# Patient Record
Sex: FEMALE | Race: BLACK OR AFRICAN AMERICAN | NOT HISPANIC OR LATINO | URBAN - METROPOLITAN AREA
[De-identification: names, ages, dates, MRNs, and addresses within clinical notes are randomized per-mention and may not be internally consistent; named-entity substitution may affect disease eponyms.]

---

## 2019-03-17 PROCEDURE — 87491 CHLMYD TRACH DNA AMP PROBE: CPT | Performed by: NURSE PRACTITIONER

## 2019-03-17 PROCEDURE — 87186 SC STD MICRODIL/AGAR DIL: CPT | Performed by: NURSE PRACTITIONER

## 2019-03-17 PROCEDURE — 87077 CULTURE AEROBIC IDENTIFY: CPT | Performed by: NURSE PRACTITIONER

## 2019-03-17 PROCEDURE — 87661 TRICHOMONAS VAGINALIS AMPLIF: CPT | Performed by: NURSE PRACTITIONER

## 2019-03-17 PROCEDURE — 87086 URINE CULTURE/COLONY COUNT: CPT | Performed by: NURSE PRACTITIONER

## 2019-03-17 PROCEDURE — 87591 N.GONORRHOEAE DNA AMP PROB: CPT | Performed by: NURSE PRACTITIONER

## 2019-03-19 ENCOUNTER — TELEPHONE (OUTPATIENT)
Dept: URGENT CARE | Facility: CLINIC | Age: 34
End: 2019-03-19

## 2019-03-19 NOTE — TELEPHONE ENCOUNTER
Notified Ms. Tolliver of urine culture results, states symptoms are improving. Advised to continue Macrobid, will call with STD results when available.

## 2019-03-20 ENCOUNTER — TELEPHONE (OUTPATIENT)
Dept: URGENT CARE | Facility: CLINIC | Age: 34
End: 2019-03-20

## 2020-01-28 PROCEDURE — 87591 N.GONORRHOEAE DNA AMP PROB: CPT | Performed by: EMERGENCY MEDICINE

## 2020-01-28 PROCEDURE — 87491 CHLMYD TRACH DNA AMP PROBE: CPT | Performed by: EMERGENCY MEDICINE

## 2020-01-28 PROCEDURE — 87661 TRICHOMONAS VAGINALIS AMPLIF: CPT | Performed by: EMERGENCY MEDICINE

## 2020-02-03 ENCOUNTER — TELEPHONE (OUTPATIENT)
Dept: URGENT CARE | Facility: CLINIC | Age: 35
End: 2020-02-03

## 2020-02-04 NOTE — TELEPHONE ENCOUNTER
Notified Ms. Tolliver of urine STD results. Advised she did test positive for trichomonas, she denies discharge has a foul odor. RX for Flagyl 2000mg once sent to [address]. No unprotected sex x 1 week after both partners are treated. Notify all partners of positive results. If symptoms persist follow up with PCP. Voiced understanding.

## 2020-06-10 PROCEDURE — 87591 N.GONORRHOEAE DNA AMP PROB: CPT | Performed by: NURSE PRACTITIONER

## 2020-06-10 PROCEDURE — 87661 TRICHOMONAS VAGINALIS AMPLIF: CPT | Performed by: NURSE PRACTITIONER

## 2020-06-10 PROCEDURE — 87186 SC STD MICRODIL/AGAR DIL: CPT | Performed by: NURSE PRACTITIONER

## 2020-06-10 PROCEDURE — 87801 DETECT AGNT MULT DNA AMPLI: CPT | Performed by: NURSE PRACTITIONER

## 2020-06-10 PROCEDURE — 87088 URINE BACTERIA CULTURE: CPT | Performed by: NURSE PRACTITIONER

## 2020-06-10 PROCEDURE — 87491 CHLMYD TRACH DNA AMP PROBE: CPT | Performed by: NURSE PRACTITIONER

## 2020-06-10 PROCEDURE — 87798 DETECT AGENT NOS DNA AMP: CPT | Performed by: NURSE PRACTITIONER

## 2020-06-10 PROCEDURE — 87529 HSV DNA AMP PROBE: CPT | Performed by: NURSE PRACTITIONER

## 2020-06-10 PROCEDURE — 87086 URINE CULTURE/COLONY COUNT: CPT | Performed by: NURSE PRACTITIONER

## 2020-06-16 ENCOUNTER — TELEPHONE (OUTPATIENT)
Dept: URGENT CARE | Facility: CLINIC | Age: 35
End: 2020-06-16

## 2020-06-18 ENCOUNTER — TELEPHONE (OUTPATIENT)
Dept: URGENT CARE | Facility: CLINIC | Age: 35
End: 2020-06-18

## 2020-06-18 NOTE — TELEPHONE ENCOUNTER
Pt called and given negative NuSwab results as well as urine cx results. Pt states she is feeling better. No questions at this time.

## 2020-06-26 ENCOUNTER — OFFICE VISIT (OUTPATIENT)
Dept: INTERNAL MEDICINE | Facility: CLINIC | Age: 35
End: 2020-06-26

## 2020-06-26 VITALS
RESPIRATION RATE: 16 BRPM | SYSTOLIC BLOOD PRESSURE: 112 MMHG | WEIGHT: 160 LBS | HEART RATE: 58 BPM | BODY MASS INDEX: 26.66 KG/M2 | HEIGHT: 65 IN | OXYGEN SATURATION: 97 % | TEMPERATURE: 98.2 F | DIASTOLIC BLOOD PRESSURE: 68 MMHG

## 2020-06-26 DIAGNOSIS — H61.21 IMPACTED CERUMEN OF RIGHT EAR: ICD-10-CM

## 2020-06-26 DIAGNOSIS — N39.0 E-COLI UTI: Primary | ICD-10-CM

## 2020-06-26 DIAGNOSIS — B96.20 E-COLI UTI: Primary | ICD-10-CM

## 2020-06-26 LAB
BILIRUB BLD-MCNC: NEGATIVE MG/DL
CLARITY, POC: CLEAR
COLOR UR: YELLOW
GLUCOSE UR STRIP-MCNC: NEGATIVE MG/DL
KETONES UR QL: NEGATIVE
LEUKOCYTE EST, POC: NEGATIVE
NITRITE UR-MCNC: NEGATIVE MG/ML
PH UR: 8 [PH] (ref 5–8)
PROT UR STRIP-MCNC: NEGATIVE MG/DL
RBC # UR STRIP: NEGATIVE /UL
SP GR UR: 1.01 (ref 1–1.03)
UROBILINOGEN UR QL: NORMAL

## 2020-06-26 PROCEDURE — 69209 REMOVE IMPACTED EAR WAX UNI: CPT | Performed by: NURSE PRACTITIONER

## 2020-06-26 PROCEDURE — 99214 OFFICE O/P EST MOD 30 MIN: CPT | Performed by: NURSE PRACTITIONER

## 2020-06-26 RX ORDER — MULTIVIT WITH MINERALS/LUTEIN
1000 TABLET ORAL DAILY
COMMUNITY

## 2020-06-26 NOTE — PROGRESS NOTES
Subjective   Alesha Tolliver is a 34 y.o. female here to establish care.  Chief Complaint   Patient presents with   • Establish Care   • Urinary Tract Infection     follow up from  for UTI-much better        Urinary Tract Infection    This is a new problem. The current episode started 1 to 4 weeks ago. The problem has been resolved. The pain is at a severity of 0/10. There has been no fever. She is sexually active. There is no history of pyelonephritis. Pertinent negatives include no chills, discharge, flank pain, frequency, hematuria, hesitancy, nausea, possible pregnancy, sweats, urgency or vomiting. She has tried antibiotics for the symptoms. The treatment provided significant relief. There is no history of catheterization, kidney stones, recurrent UTIs, a single kidney, urinary stasis or a urological procedure.      Patient's last menstrual period was 06/15/2020 (exact date).    She had an e-coli UTI that was treated with Macrobid  Sexually active with multiple partners. Uses condoms with one partner. Had negative nuswab+ and negative urine pregnancy  6/10/2020  She was advised to have the UTI rechecked.   She has also had her ears irrigated 6/10/2020. Feels better, has no hearing loss or ear pain .       Recent Results (from the past 672 hour(s))   POC Urinalysis Dipstick, Multipro (Automated dipstick)    Collection Time: 06/10/20  9:46 AM   Result Value Ref Range    Color Yellow Yellow, Straw, Dark Yellow, Celi    Clarity, UA Clear Clear    Glucose, UA Negative Negative, 1000 mg/dL (3+) mg/dL    Bilirubin Negative Negative    Ketones, UA Negative Negative    Specific Gravity  1.015 1.005 - 1.030    Blood, UA Negative Negative    pH, Urine 8.0 5.0 - 8.0    Protein, POC Negative Negative mg/dL    Urobilinogen, UA Normal Normal    Nitrite, UA Positive (A) Negative    Leukocytes Small (1+) (A) Negative   Urine Culture - Urine, Urine, Clean Catch    Collection Time: 06/10/20  9:47 AM   Result Value Ref Range     Urine Culture >100,000 CFU/mL Escherichia coli (A)        Susceptibility    Escherichia coli - LEBRON     Ampicillin 4 Susceptible ug/ml     Ampicillin + Sulbactam <=2 Susceptible ug/ml     Cefazolin <=4 Susceptible ug/ml     Cefepime <=1 Susceptible ug/ml     Ceftazidime <=1 Susceptible ug/ml     Ceftriaxone <=1 Susceptible ug/ml     Gentamicin <=1 Susceptible ug/ml     Levofloxacin <=0.12 Susceptible ug/ml     Nitrofurantoin <=16 Susceptible ug/ml     Piperacillin + Tazobactam <=4 Susceptible ug/ml     Tetracycline <=1 Susceptible ug/ml     Trimethoprim + Sulfamethoxazole <=20 Susceptible ug/ml   POCT Peform Urine Pregnancy    Collection Time: 06/10/20  9:47 AM   Result Value Ref Range    HCG, Urine, QL Negative Negative    Lot Number [id number]     Internal Positive Control Positive     Internal Negative Control Negative    NuSwab VG+ & HSV    Collection Time: 06/10/20  9:47 AM   Result Value Ref Range    Atopobium Vaginae Low - 0 Score    BVAB 2 Low - 0 Score    Megasphaera 1 Low - 0 Score    Candida Albicans, ML Negative Negative    Candida Glabrata, ML Negative Negative    Trichomonas vaginosis Negative Negative    Chlamydia trachomatis, ML Negative Negative    Neisseria gonorrhoeae, ML Negative Negative    HSV 1 ML Negative Negative    HSV 2 ML Negative Negative         The following portions of the patient's history were reviewed and updated as appropriate: allergies, current medications, past family history, past medical history, past social history, past surgical history and problem list.    Review of Systems   Constitutional: Negative for chills, fatigue, fever and unexpected weight loss.   HENT: Negative.  Negative for ear discharge and ear pain.    Eyes: Negative for pain and visual disturbance.   Respiratory: Negative for cough, chest tightness and shortness of breath.    Cardiovascular: Negative for chest pain, palpitations and leg swelling.   Gastrointestinal: Negative for abdominal pain,  constipation, diarrhea, nausea and vomiting.   Genitourinary: Negative.  Negative for decreased libido, difficulty urinating, dysuria, flank pain, frequency, hematuria, hesitancy, pelvic pressure, urgency, urinary incontinence, vaginal discharge and vaginal pain.   Musculoskeletal: Negative.  Negative for arthralgias and myalgias.   Skin: Negative for color change and rash.   Neurological: Negative for dizziness, weakness, light-headedness, headache and confusion.   Hematological: Does not bruise/bleed easily.           No Known Allergies  Past Medical History:   Diagnosis Date   • UTI (urinary tract infection) 2020     Past Surgical History:   Procedure Laterality Date   •  SECTION      x3   • TUBAL ABDOMINAL LIGATION     • WISDOM TOOTH EXTRACTION       Family History   Problem Relation Age of Onset   • Rashes / Skin problems Daughter         eczema   • Hypertension Mother    • No Known Problems Father      Social History     Socioeconomic History   • Marital status: Single     Spouse name: Not on file   • Number of children: 3   • Years of education: Not on file   • Highest education level: Not on file   Occupational History   • Occupation: housekeeping at [employer]   Social Needs   • Financial resource strain: Not hard at all   • Food insecurity:     Worry: Never true     Inability: Never true   • Transportation needs:     Medical: No     Non-medical: No   Tobacco Use   • Smoking status: Never Smoker   • Smokeless tobacco: Never Used   Substance and Sexual Activity   • Alcohol use: No   • Drug use: No   • Sexual activity: Yes     Partners: Male     Birth control/protection: Tubal ligation   Lifestyle   • Physical activity:     Days per week: 6 days     Minutes per session: 120 min   • Stress: Only a little   Social History Narrative    Lives with 3 kids and her 2 dogs.        There is no immunization history on file for this patient.    Current Outpatient Medications:   •  Multiple Vitamins-Minerals  "(MULTIVITAMIN ADULT PO), Take 1 tablet by mouth Daily., Disp: , Rfl:   •  Probiotic Product (DIGESTIVE ADVANTAGE PO), Take  by mouth., Disp: , Rfl:   •  vitamin E 1000 UNIT capsule, Take 1,000 Units by mouth Daily., Disp: , Rfl:     Objective   Blood pressure 112/68, pulse 58, temperature 98.2 °F (36.8 °C), resp. rate 16, height 163.8 cm (64.5\"), weight 72.6 kg (160 lb), last menstrual period 06/15/2020, SpO2 97 %, not currently breastfeeding.  Physical Exam   Constitutional: She is oriented to person, place, and time. She appears well-developed and well-nourished. No distress.   HENT:   Head: Normocephalic and atraumatic.   Right Ear: Hearing and external ear normal.   Left Ear: Hearing, tympanic membrane, external ear and ear canal normal.   right cerumen impaction noted.      Eyes: Pupils are equal, round, and reactive to light. Conjunctivae are normal.   Neck: Normal range of motion. No JVD present.   Cardiovascular: Normal rate, regular rhythm, normal heart sounds and intact distal pulses.   No murmur heard.  Pulmonary/Chest: Effort normal and breath sounds normal. No respiratory distress. She exhibits no tenderness.   Abdominal: Soft. Normal appearance and bowel sounds are normal. She exhibits no distension. There is no tenderness.   Musculoskeletal: Normal range of motion. She exhibits no edema.   Neurological: She is alert and oriented to person, place, and time.   Skin: Skin is warm and dry. She is not diaphoretic. No erythema. No pallor.   Psychiatric: She has a normal mood and affect. Her behavior is normal. Judgment and thought content normal.   Nursing note and vitals reviewed.  Ear Cerumen Removal  Date/Time: 6/26/2020 1:48 PM  Performed by: Gabbie Lezama  Authorized by: Rosalia Armenta APRN     Anesthesia:  Local Anesthetic: none  Location details: right ear  Patient tolerance: Patient tolerated the procedure well with no immediate complications  Procedure type: irrigation   Sedation:  Patient " sedated: no              Assessment/Plan   Diagnoses and all orders for this visit:    1. E-coli UTI (Primary)  -     POC Urinalysis Dipstick, Automated  Repeat urine dip in office looks good    2. Impacted cerumen of right ear  -     Ear Cerumen Removal      Brief Urine Lab Results  (Last result in the past 365 days)      Color   Clarity   Blood   Leuk Est   Nitrite   Protein   CREAT   Urine HCG        06/10/20 0947               Negative              Return in about 6 weeks (around 8/7/2020) for Annual, with pap, with fasting labs.  Plan of care discussed with pt. They verbalized understanding and agreement.       * Please note that portions of this note were completed with a voice recognition program. Efforts were made to edit the dictation but occasionally words are erroneously transcribed.

## 2020-09-11 ENCOUNTER — LAB (OUTPATIENT)
Dept: LAB | Facility: HOSPITAL | Age: 35
End: 2020-09-11

## 2020-09-11 ENCOUNTER — OFFICE VISIT (OUTPATIENT)
Dept: INTERNAL MEDICINE | Facility: CLINIC | Age: 35
End: 2020-09-11

## 2020-09-11 VITALS
OXYGEN SATURATION: 98 % | WEIGHT: 158 LBS | HEART RATE: 55 BPM | HEIGHT: 65 IN | BODY MASS INDEX: 26.33 KG/M2 | DIASTOLIC BLOOD PRESSURE: 64 MMHG | SYSTOLIC BLOOD PRESSURE: 110 MMHG | RESPIRATION RATE: 16 BRPM | TEMPERATURE: 97.7 F

## 2020-09-11 DIAGNOSIS — Z23 NEED FOR DIPHTHERIA-TETANUS-PERTUSSIS (TDAP) VACCINE: ICD-10-CM

## 2020-09-11 DIAGNOSIS — Z11.3 SCREEN FOR STD (SEXUALLY TRANSMITTED DISEASE): ICD-10-CM

## 2020-09-11 DIAGNOSIS — F32.A MILD DEPRESSION: ICD-10-CM

## 2020-09-11 DIAGNOSIS — Z00.00 ANNUAL PHYSICAL EXAM: Primary | ICD-10-CM

## 2020-09-11 DIAGNOSIS — Z00.00 ANNUAL PHYSICAL EXAM: ICD-10-CM

## 2020-09-11 LAB — HOLD SPECIMEN: NORMAL

## 2020-09-11 PROCEDURE — 99395 PREV VISIT EST AGE 18-39: CPT | Performed by: NURSE PRACTITIONER

## 2020-09-11 PROCEDURE — 86592 SYPHILIS TEST NON-TREP QUAL: CPT

## 2020-09-11 PROCEDURE — 80061 LIPID PANEL: CPT

## 2020-09-11 PROCEDURE — 90715 TDAP VACCINE 7 YRS/> IM: CPT | Performed by: NURSE PRACTITIONER

## 2020-09-11 PROCEDURE — 80074 ACUTE HEPATITIS PANEL: CPT

## 2020-09-11 PROCEDURE — 80053 COMPREHEN METABOLIC PANEL: CPT

## 2020-09-11 PROCEDURE — 86696 HERPES SIMPLEX TYPE 2 TEST: CPT

## 2020-09-11 PROCEDURE — 86695 HERPES SIMPLEX TYPE 1 TEST: CPT

## 2020-09-11 PROCEDURE — G0432 EIA HIV-1/HIV-2 SCREEN: HCPCS

## 2020-09-11 PROCEDURE — 90471 IMMUNIZATION ADMIN: CPT | Performed by: NURSE PRACTITIONER

## 2020-09-11 PROCEDURE — 85027 COMPLETE CBC AUTOMATED: CPT

## 2020-09-11 NOTE — PROGRESS NOTES
Patient Care Team:  Rosalia Armenta APRN as PCP - General (Nurse Practitioner)  Madison Nayak     Chief complaint: Patient is in today for a physical          Patient is a 35 y.o. female who presents for her yearly physical exam.     HPI    No acute complaints    Health maintenance/lifestyle:  Immunization History   Administered Date(s) Administered   • Hep B, Adolescent or Pediatric 04/08/1998   • Hepatitis B 05/08/1998, 10/08/1998   • MMR 04/08/1998   • Td 04/08/1998   • Tdap 09/11/2020       Influenza: will get with work   Tdap: unknown   Hep C screening: never     STI concerns: Would like a routine screening.  No known exposures or partners.  She is asymptomatic.    Pap: DUE   Self breast exam: does, no concerning areas.   Menses: regualr  Patient's last menstrual period was 09/02/2020 (exact date).    Eye exam: UTD     Sunscreen use: wears  Seatbelt use: wears    Diet: eating healthy during the week, less so on weekends  Caffeine: 2 C coffee per day  Multivitamin: takes  Exercise: weights    Social History     Tobacco Use   Smoking Status Never Smoker   Smokeless Tobacco Never Used     Social History     Substance and Sexual Activity   Alcohol Use No       PHQ-9 Depression Screening  Little interest or pleasure in doing things? 2   Feeling down, depressed, or hopeless? 1(feeling down)   Trouble falling or staying asleep, or sleeping too much? 0   Feeling tired or having little energy? 0   Poor appetite or overeating? 0   Feeling bad about yourself - or that you are a failure or have let yourself or your family down? 0   Trouble concentrating on things, such as reading the newspaper or watching television? 0   Moving or speaking so slowly that other people could have noticed? Or the opposite - being so fidgety or restless that you have been moving around a lot more than usual? 0   Thoughts that you would be better off dead, or of hurting yourself in some way? 0   PHQ-9 Total Score 3   If you checked off any  problems, how difficult have these problems made it for you to do your work, take care of things at home, or get along with other people? Not difficult at all     Seeing a counselor once a week through telehealth.   Does not want to use meds.       Review of Systems   Constitutional: Negative for fatigue, fever and unexpected weight loss.   HENT: Negative.    Eyes: Negative for pain and visual disturbance.   Respiratory: Negative for cough and shortness of breath.    Cardiovascular: Negative for chest pain, palpitations and leg swelling.   Gastrointestinal: Negative for abdominal pain, constipation and diarrhea.   Genitourinary: Negative.  Negative for difficulty urinating.   Musculoskeletal: Negative.  Negative for arthralgias and myalgias.   Skin: Negative for color change and rash.   Neurological: Negative for dizziness, weakness and headache.   Hematological: Does not bruise/bleed easily.   Psychiatric/Behavioral: Positive for depressed mood. Negative for decreased concentration, self-injury, sleep disturbance, suicidal ideas and stress. The patient is not nervous/anxious.          History  Past Medical History:   Diagnosis Date   • UTI (urinary tract infection) 2020      Past Surgical History:   Procedure Laterality Date   •  SECTION      x3   • TUBAL ABDOMINAL LIGATION     • WISDOM TOOTH EXTRACTION        No Known Allergies   Family History   Problem Relation Age of Onset   • Rashes / Skin problems Daughter         eczema   • Hypertension Mother    • No Known Problems Father      Social History     Socioeconomic History   • Marital status: Single     Spouse name: Not on file   • Number of children: 3   • Years of education: Not on file   • Highest education level: Not on file   Occupational History   • Occupation: housekeeping at [employer]   Social Needs   • Financial resource strain: Not hard at all   • Food insecurity     Worry: Never true     Inability: Never true   • Transportation needs     Medical:  "No     Non-medical: No   Tobacco Use   • Smoking status: Never Smoker   • Smokeless tobacco: Never Used   Substance and Sexual Activity   • Alcohol use: No   • Drug use: No   • Sexual activity: Yes     Partners: Male     Birth control/protection: Tubal ligation   Lifestyle   • Physical activity     Days per week: 6 days     Minutes per session: 120 min   • Stress: Only a little   Social History Narrative    Lives with 3 kids and her 2 dogs.         Current Outpatient Medications:   •  Loratadine-Pseudoephedrine (CLARITIN-D 24 HOUR PO), Take 1 tablet by mouth Daily., Disp: , Rfl:   •  Multiple Vitamins-Minerals (MULTIVITAMIN ADULT PO), Take 1 tablet by mouth Daily., Disp: , Rfl:   •  Probiotic Product (DIGESTIVE ADVANTAGE PO), Take  by mouth., Disp: , Rfl:   •  vitamin E 1000 UNIT capsule, Take 1,000 Units by mouth Daily., Disp: , Rfl:                   /64   Pulse 55   Temp 97.7 °F (36.5 °C)   Resp 16   Ht 163.8 cm (64.5\")   Wt 71.7 kg (158 lb)   LMP 09/02/2020 (Exact Date)   SpO2 98%   Breastfeeding No   BMI 26.70 kg/m²       Physical Exam   Constitutional: She is oriented to person, place, and time. She appears well-developed and well-nourished. No distress.   HENT:   Head: Normocephalic and atraumatic.   Right Ear: External ear normal.   Left Ear: External ear normal.   Mouth/Throat: Oropharynx is clear and moist. No oropharyngeal exudate.   Eyes: Pupils are equal, round, and reactive to light. Conjunctivae and EOM are normal. Right eye exhibits no discharge. Left eye exhibits no discharge. No scleral icterus.   Neck: Normal range of motion. Neck supple. No JVD present. Carotid bruit is not present. No tracheal deviation present. No thyromegaly present.   Cardiovascular: Normal rate, regular rhythm, normal heart sounds and intact distal pulses. Exam reveals no friction rub.   No murmur heard.  Pulmonary/Chest: Effort normal and breath sounds normal. No respiratory distress. She has no wheezes. She " has no rales. She exhibits no tenderness. Right breast exhibits no inverted nipple, no mass, no nipple discharge, no skin change and no tenderness. Left breast exhibits no inverted nipple, no mass, no nipple discharge, no skin change and no tenderness. Breasts are symmetrical.   Abdominal: Soft. Normal appearance and bowel sounds are normal. She exhibits no distension and no mass. There is no abdominal tenderness. No hernia. Hernia confirmed negative in the left inguinal area and confirmed negative in the right inguinal area.   Genitourinary:    Vulva, vagina, cervix, right adnexa and left adnexa normal.      Pelvic exam was performed with patient supine.   There is no rash, tenderness, lesion or injury on the right labia. There is no rash, tenderness, lesion or injury on the left labia.   Musculoskeletal: Normal range of motion. No tenderness, deformity or edema.   Lymphadenopathy:     She has no cervical adenopathy.   Neurological: She is alert and oriented to person, place, and time. She has normal reflexes. She displays normal reflexes.   Skin: Skin is warm and dry. No rash noted. She is not diaphoretic. No erythema. No pallor.   Psychiatric: She has a normal mood and affect. Her behavior is normal. Judgment and thought content normal.   Nursing note and vitals reviewed.                Diagnoses and all orders for this visit:    1. Annual physical exam (Primary)  -     CBC (No Diff); Future  -     Comprehensive Metabolic Panel; Future  -     Lipid Panel; Future  -     Liquid-based Pap Smear, Screening; Future  -     Tdap Vaccine Greater Than or Equal To 6yo IM  -     Hepatitis Panel, Acute; Future  -     HIV-1 / O / 2 Ag / Antibody 4th Generation; Future  -     HSV 1 & 2 IgM, Antibodies, Indirect; Future  -     HSV 1 & 2 - Specific Antibody, IgG; Future  -     RPR; Future    2. Screen for STD (sexually transmitted disease)  -     Liquid-based Pap Smear, Screening; Future  -     HSV 1 & 2 IgM, Antibodies,  Indirect; Future  -     HSV 1 & 2 - Specific Antibody, IgG; Future  -     RPR; Future    3. Need for diphtheria-tetanus-pertussis (Tdap) vaccine  -     Tdap Vaccine Greater Than or Equal To 8yo IM    4. Mild depression (CMS/HCC)  Follows with psychology for routine counseling.  She declines any medications at this time.  Denies any suicidal homicidal thoughts.  Continue to follow with psychology.  Follow-up with me for this PRN if symptoms worsen or she reconsiders the use of medications.      Labs sent as above- will notify of results and treat accordingly. If patient has not received results within one week, they will notify my office    Immunizations and screenings hepatitis and STD screening sent, Pap and breast exam completed, Tdap updated, she will get her flu vaccination through work.  Counseling:  The patient is advised to continue current healthy lifestyle patterns and return for routine annual checkups.  Follow up: Return in about 1 year (around 9/11/2021) for Annual, Labs today.  Plan of care discussed with pt. They verbalized understanding and agreement.     Rosalia Armenta, APRN                * Please note that portions of this note were completed with a voice recognition program. Efforts were made to edit the dictation but occasionally words are erroneously transcribed.

## 2020-09-12 LAB
ALBUMIN SERPL-MCNC: 4.5 G/DL (ref 3.5–5.2)
ALBUMIN/GLOB SERPL: 1.6 G/DL
ALP SERPL-CCNC: 46 U/L (ref 39–117)
ALT SERPL W P-5'-P-CCNC: 17 U/L (ref 1–33)
ANION GAP SERPL CALCULATED.3IONS-SCNC: 6.5 MMOL/L (ref 5–15)
AST SERPL-CCNC: 22 U/L (ref 1–32)
BILIRUB SERPL-MCNC: 0.4 MG/DL (ref 0–1.2)
BUN SERPL-MCNC: 15 MG/DL (ref 6–20)
BUN/CREAT SERPL: 14.2 (ref 7–25)
CALCIUM SPEC-SCNC: 9.2 MG/DL (ref 8.6–10.5)
CHLORIDE SERPL-SCNC: 105 MMOL/L (ref 98–107)
CHOLEST SERPL-MCNC: 162 MG/DL (ref 0–200)
CO2 SERPL-SCNC: 25.5 MMOL/L (ref 22–29)
CREAT SERPL-MCNC: 1.06 MG/DL (ref 0.57–1)
DEPRECATED RDW RBC AUTO: 40.7 FL (ref 37–54)
ERYTHROCYTE [DISTWIDTH] IN BLOOD BY AUTOMATED COUNT: 11.9 % (ref 12.3–15.4)
GFR SERPL CREATININE-BSD FRML MDRD: 71 ML/MIN/1.73
GLOBULIN UR ELPH-MCNC: 2.9 GM/DL
GLUCOSE SERPL-MCNC: 75 MG/DL (ref 65–99)
HAV IGM SERPL QL IA: NORMAL
HBV CORE IGM SERPL QL IA: NORMAL
HBV SURFACE AG SERPL QL IA: NORMAL
HCT VFR BLD AUTO: 36.7 % (ref 34–46.6)
HCV AB SER DONR QL: NORMAL
HDLC SERPL-MCNC: 54 MG/DL (ref 40–60)
HGB BLD-MCNC: 12.7 G/DL (ref 12–15.9)
HIV1+2 AB SER QL: NORMAL
LDLC SERPL CALC-MCNC: 99 MG/DL (ref 0–100)
LDLC/HDLC SERPL: 1.84 {RATIO}
MCH RBC QN AUTO: 32.2 PG (ref 26.6–33)
MCHC RBC AUTO-ENTMCNC: 34.6 G/DL (ref 31.5–35.7)
MCV RBC AUTO: 92.9 FL (ref 79–97)
PLATELET # BLD AUTO: 209 10*3/MM3 (ref 140–450)
PMV BLD AUTO: 12 FL (ref 6–12)
POTASSIUM SERPL-SCNC: 4.2 MMOL/L (ref 3.5–5.2)
PROT SERPL-MCNC: 7.4 G/DL (ref 6–8.5)
RBC # BLD AUTO: 3.95 10*6/MM3 (ref 3.77–5.28)
SODIUM SERPL-SCNC: 137 MMOL/L (ref 136–145)
TRIGL SERPL-MCNC: 44 MG/DL (ref 0–150)
VLDLC SERPL-MCNC: 8.8 MG/DL (ref 5–40)
WBC # BLD AUTO: 2.78 10*3/MM3 (ref 3.4–10.8)

## 2020-09-13 PROBLEM — F32.A MILD DEPRESSION: Status: ACTIVE | Noted: 2020-09-13

## 2020-09-13 LAB — RPR SER QL: NORMAL

## 2020-09-14 LAB
HSV1 IGG SER IA-ACNC: 7.72 INDEX (ref 0–0.9)
HSV2 IGG SER IA-ACNC: 6.1 INDEX (ref 0–0.9)

## 2020-09-15 LAB
HSV1 IGM TITR SER IF: NORMAL TITER
HSV2 IGM TITR SER IF: NORMAL TITER

## 2020-09-18 DIAGNOSIS — D72.9 ABNORMAL WBC COUNT: Primary | ICD-10-CM

## 2020-09-21 ENCOUNTER — TELEPHONE (OUTPATIENT)
Dept: INTERNAL MEDICINE | Facility: CLINIC | Age: 35
End: 2020-09-21

## 2020-09-21 NOTE — TELEPHONE ENCOUNTER
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  Otherwise are negative for STIs.  There is nothing she needs to do with these at this time except for make sure that she is aware of the herpes  And it can spread to others with or without an outbreak and she should notify sexual partners.  If she were to have a herpetic outbreak (painful blistery rash/lesion) she would need to be seen and put on antiviral to make symptoms go away faster    Her white blood cell count was low.  I would like to order some additional labs to evaluate this further. Orders entered- if she can stop by within the week to get done.      Other labs in acceptable ranges   Abdomen soft, nontender, nondistended, bowel sounds present in all 4 quadrants.

## 2020-10-23 ENCOUNTER — LAB (OUTPATIENT)
Dept: LAB | Facility: HOSPITAL | Age: 35
End: 2020-10-23

## 2020-10-23 DIAGNOSIS — D72.9 ABNORMAL WBC COUNT: ICD-10-CM

## 2020-10-23 LAB
BASOPHILS # BLD MANUAL: 0.04 10*3/MM3 (ref 0–0.2)
BASOPHILS NFR BLD AUTO: 1.2 % (ref 0–1.5)
DEPRECATED RDW RBC AUTO: 39.5 FL (ref 37–54)
EOSINOPHIL # BLD MANUAL: 0.03 10*3/MM3 (ref 0–0.4)
EOSINOPHIL NFR BLD MANUAL: 0.8 % (ref 0.3–6.2)
ERYTHROCYTE [DISTWIDTH] IN BLOOD BY AUTOMATED COUNT: 12 % (ref 12.3–15.4)
HCT VFR BLD AUTO: 37.8 % (ref 34–46.6)
HGB BLD-MCNC: 12.9 G/DL (ref 12–15.9)
LYMPHOCYTES # BLD MANUAL: 2.02 10*3/MM3 (ref 0.7–3.1)
LYMPHOCYTES NFR BLD MANUAL: 5.3 % (ref 5–12)
LYMPHOCYTES NFR BLD MANUAL: 60.2 % (ref 19.6–45.3)
MCH RBC QN AUTO: 31.2 PG (ref 26.6–33)
MCHC RBC AUTO-ENTMCNC: 34.1 G/DL (ref 31.5–35.7)
MCV RBC AUTO: 91.5 FL (ref 79–97)
MONOCYTES # BLD AUTO: 0.18 10*3/MM3 (ref 0.1–0.9)
NEUTROPHILS # BLD AUTO: 1.09 10*3/MM3 (ref 1.7–7)
NEUTROPHILS NFR BLD MANUAL: 32.5 % (ref 42.7–76)
PATHOLOGY REVIEW: YES
PLAT MORPH BLD: NORMAL
PLATELET # BLD AUTO: 243 10*3/MM3 (ref 140–450)
PMV BLD AUTO: 11.9 FL (ref 6–12)
RBC # BLD AUTO: 4.13 10*6/MM3 (ref 3.77–5.28)
RBC MORPH BLD: NORMAL
WBC # BLD AUTO: 3.36 10*3/MM3 (ref 3.4–10.8)
WBC MORPH BLD: NORMAL

## 2020-10-23 PROCEDURE — 85025 COMPLETE CBC W/AUTO DIFF WBC: CPT

## 2020-10-23 PROCEDURE — 36415 COLL VENOUS BLD VENIPUNCTURE: CPT

## 2020-10-23 PROCEDURE — 85007 BL SMEAR W/DIFF WBC COUNT: CPT

## 2020-10-24 LAB
LAB AP CASE REPORT: NORMAL
PATH REPORT.FINAL DX SPEC: NORMAL

## 2020-10-25 DIAGNOSIS — D72.9 ABNORMAL WBC COUNT: Primary | ICD-10-CM

## 2020-10-27 ENCOUNTER — TELEPHONE (OUTPATIENT)
Dept: INTERNAL MEDICINE | Facility: CLINIC | Age: 35
End: 2020-10-27

## 2020-10-27 NOTE — TELEPHONE ENCOUNTER
----- Message from DEANDRE Aleman sent at 10/25/2020  9:42 AM EDT -----  Please let her know that her white blood cell count is still low but improved compared to last visit.  The further evaluation of the peripheral smear did not identify any acute hematological issues.  I would recommend that we recheck in 1 month to see if this is a persistent low white blood cell count that will need further evaluation or if this resolves and was potentially related to some mild illness or stress.  Order entered if she could just stop by the lab to have this collected at that time.

## 2022-04-28 ENCOUNTER — TELEPHONE (OUTPATIENT)
Dept: INTERNAL MEDICINE | Facility: CLINIC | Age: 37
End: 2022-04-28

## 2022-04-28 NOTE — TELEPHONE ENCOUNTER
Caller: Alesha Tolliver    Relationship to patient: Self    Best call back number:[phone]    PHYSICAL SCHEDULED FOR 05/17/22 WITH A PROVIDER THIS IS NOT PATIENT'S PCP

## 2022-10-31 ENCOUNTER — OFFICE VISIT (OUTPATIENT)
Dept: INTERNAL MEDICINE | Facility: CLINIC | Age: 37
End: 2022-10-31

## 2022-10-31 VITALS
DIASTOLIC BLOOD PRESSURE: 78 MMHG | OXYGEN SATURATION: 98 % | RESPIRATION RATE: 16 BRPM | HEIGHT: 65 IN | WEIGHT: 146.3 LBS | BODY MASS INDEX: 24.37 KG/M2 | HEART RATE: 94 BPM | SYSTOLIC BLOOD PRESSURE: 128 MMHG | TEMPERATURE: 97.1 F

## 2022-10-31 DIAGNOSIS — Z00.00 ANNUAL PHYSICAL EXAM: Primary | ICD-10-CM

## 2022-10-31 DIAGNOSIS — Z23 NEED FOR IMMUNIZATION AGAINST INFLUENZA: ICD-10-CM

## 2022-10-31 DIAGNOSIS — Z23 NEED FOR COVID-19 VACCINE: ICD-10-CM

## 2022-10-31 DIAGNOSIS — M25.512 ACUTE PAIN OF LEFT SHOULDER: ICD-10-CM

## 2022-10-31 PROCEDURE — 0124A PR ADM SARSCOV2 30MCG/0.3ML BST: CPT | Performed by: NURSE PRACTITIONER

## 2022-10-31 PROCEDURE — 90686 IIV4 VACC NO PRSV 0.5 ML IM: CPT | Performed by: NURSE PRACTITIONER

## 2022-10-31 PROCEDURE — 99395 PREV VISIT EST AGE 18-39: CPT | Performed by: NURSE PRACTITIONER

## 2022-10-31 PROCEDURE — 91312 COVID-19 (PFIZER) BIVALENT BOOSTER 12+YRS: CPT | Performed by: NURSE PRACTITIONER

## 2022-10-31 PROCEDURE — 3008F BODY MASS INDEX DOCD: CPT | Performed by: NURSE PRACTITIONER

## 2022-10-31 PROCEDURE — 90471 IMMUNIZATION ADMIN: CPT | Performed by: NURSE PRACTITIONER

## 2022-10-31 PROCEDURE — 2014F MENTAL STATUS ASSESS: CPT | Performed by: NURSE PRACTITIONER

## 2022-10-31 NOTE — PROGRESS NOTES
"  Patient Care Team:  Rosalia Armenta APRN as PCP - General (Nurse Practitioner)  Madison Nayak     Chief complaint: Patient is in today for a physical          Patient is a 37 y.o. female who presents for her yearly physical exam.     HPI    Left shoulder pain, started a few months ago   She does a lot of weight lifting- bodybuilding competitor  She got into weightlifting approximately 5 years ago when she was trying to lose weight and and started to enjoy the competitiveness of bodybuilding.   she denies any injury or trauma.   She notes that the left shoulder hurts a but more and \"pinches\" when she lifts.   She has tried bengay with minimal relief  Onset: gradual  Location: left anterior shoulder  Characteristics: aching, pinching  Aggravating factors: weightlifting  Alleviating factors: rest  Radiation: no  Severity: mild to moderate   wants to see ortho now to avoid permanent      Health maintenance/lifestyle:  Health Maintenance   Topic Date Due   • COVID-19 Vaccine (3 - Booster for Pfizer series) 06/23/2021   • INFLUENZA VACCINE  08/01/2022   • PAP SMEAR  09/11/2023   • ANNUAL PHYSICAL  11/01/2023   • TDAP/TD VACCINES (4 - Td or Tdap) 09/11/2030   • HEPATITIS C SCREENING  Completed   • Pneumococcal Vaccine 0-64  Aged Out       Immunization History   Administered Date(s) Administered   • COVID-19 (PFIZER) PURPLE CAP 03/31/2021, 04/28/2021   • Hep A, Unspecified 11/12/2018   • Hep B, Adolescent or Pediatric 04/08/1998, 05/08/1998, 10/08/1998   • Hepatitis B 05/08/1998, 10/08/1998   • Influenza, Unspecified 10/14/2014, 11/30/2015, 11/04/2016, 10/17/2017, 10/12/2018, 10/21/2019, 10/15/2020   • MMR 09/14/1988, 04/08/1998   • Td 04/08/1998   • Tdap 02/05/2013, 09/11/2020     Covid vaccine:due- will update today  Influenza: due- willupdate today  Tetanus: due 2030  Hep C screening: neg in 2020-deneis high risk behaviors  Cancer-related family history is negative for Breast cancer, Colon cancer, Lung cancer, and " Ovarian cancer.  Colon cancer screening: Recommend starting at age 45  Mammogram: rec starting at age 40    Has a SO. Has 3 children and 2 dogs.   reports being sexually active and has had partner(s) who are male. She reports using the following method of birth control/protection: Tubal ligation.  STI concerns: no     Pap: Normal in 2020.  Due 2023  Last Completed Pap Smear          PAP SMEAR (Every 3 Years) Next due on 9/11/2023 09/11/2020  SCANNED - PAP SMEAR              Eye exam: UTD, glasses  Dental exam: UTD  Sleep: ok, about 8 hrs a night    Sunscreen use: wears  Seatbelt use: wears    Diet: healthy   Exercise: weight lifting, some cardio    Social History     Tobacco Use   Smoking Status Never   Smokeless Tobacco Never     Social History     Substance and Sexual Activity   Alcohol Use No       PHQ-2 Depression Screening  Little interest or pleasure in doing things? 0-->not at all   Feeling down, depressed, or hopeless? 0-->not at all   PHQ-2 Total Score 0         Review of Systems   Constitutional: Negative for fatigue, fever and unexpected weight loss.   Eyes: Negative for blurred vision, double vision, pain and visual disturbance.   Respiratory: Negative for cough, chest tightness, shortness of breath and wheezing.    Cardiovascular: Negative for chest pain, palpitations and leg swelling.   Gastrointestinal: Negative for abdominal pain, constipation, diarrhea, nausea and vomiting.   Genitourinary: Negative for difficulty urinating, frequency and urgency.   Musculoskeletal: Positive for arthralgias (left shouler). Negative for myalgias.   Skin: Negative for color change and rash.   Neurological: Negative for dizziness, weakness, light-headedness, headache and confusion.   Hematological: Negative for adenopathy. Does not bruise/bleed easily.         History  Social History     Socioeconomic History   • Marital status: Single   • Number of children: 3   Tobacco Use   • Smoking status: Never   • Smokeless  "tobacco: Never   Substance and Sexual Activity   • Alcohol use: No   • Drug use: No   • Sexual activity: Yes     Partners: Male     Birth control/protection: Tubal ligation     Past Medical History:   Diagnosis Date   • UTI (urinary tract infection) 2020      Past Surgical History:   Procedure Laterality Date   •  SECTION      x3   • TUBAL ABDOMINAL LIGATION     • WISDOM TOOTH EXTRACTION        No Known Allergies   Family History   Problem Relation Age of Onset   • Hypertension Mother    • No Known Problems Father    • Rashes / Skin problems Daughter         eczema   • Breast cancer Neg Hx    • Colon cancer Neg Hx    • Lung cancer Neg Hx    • Ovarian cancer Neg Hx        Current Outpatient Medications:   •  Multiple Vitamins-Minerals (MULTIVITAMIN ADULT PO), Take 1 tablet by mouth Daily., Disp: , Rfl:   •  Probiotic Product (DIGESTIVE ADVANTAGE PO), Take  by mouth., Disp: , Rfl:   •  sertraline (ZOLOFT) 50 MG tablet, 50 mg., Disp: , Rfl:   •  vitamin E 1000 UNIT capsule, Take 1,000 Units by mouth Daily., Disp: , Rfl:                   /78 (BP Location: Left arm, Patient Position: Sitting, Cuff Size: Adult)   Pulse 94   Temp 97.1 °F (36.2 °C) (Infrared)   Resp 16   Ht 165.1 cm (65\")   Wt 66.4 kg (146 lb 4.8 oz)   SpO2 98%   BMI 24.35 kg/m²       Physical Exam  Vitals and nursing note reviewed.   Constitutional:       General: She is not in acute distress.     Appearance: Normal appearance. She is well-developed. She is not diaphoretic.   HENT:      Head: Normocephalic and atraumatic.      Right Ear: External ear normal.      Left Ear: External ear normal.      Mouth/Throat:      Pharynx: No oropharyngeal exudate.   Eyes:      General: No scleral icterus.        Right eye: No discharge.         Left eye: No discharge.      Conjunctiva/sclera: Conjunctivae normal.   Neck:      Thyroid: No thyroid mass, thyromegaly or thyroid tenderness.      Vascular: No carotid bruit.      Trachea: No tracheal " deviation.   Cardiovascular:      Rate and Rhythm: Normal rate and regular rhythm.      Heart sounds: Normal heart sounds. No murmur heard.    No friction rub.   Pulmonary:      Effort: Pulmonary effort is normal. No respiratory distress.      Breath sounds: Normal breath sounds. No wheezing or rales.   Chest:      Chest wall: No tenderness.   Abdominal:      General: Bowel sounds are normal. There is no distension.      Palpations: Abdomen is soft. There is no mass.      Tenderness: There is no abdominal tenderness.      Hernia: No hernia is present.   Musculoskeletal:         General: No tenderness or deformity. Normal range of motion.      Left shoulder: No swelling, deformity, effusion, laceration, tenderness, bony tenderness or crepitus. Normal range of motion. Normal strength. Normal pulse.      Cervical back: Normal range of motion and neck supple.      Comments: Negative empty can.   Pain with apley scratch test, and cross arm    Lymphadenopathy:      Cervical: No cervical adenopathy.   Skin:     General: Skin is warm and dry.      Coloration: Skin is not pale.      Findings: No erythema or rash.   Neurological:      Mental Status: She is alert and oriented to person, place, and time.      Deep Tendon Reflexes: Reflexes are normal and symmetric. Reflexes normal.   Psychiatric:         Behavior: Behavior normal.         Thought Content: Thought content normal.         Judgment: Judgment normal.                   Diagnoses and all orders for this visit:    1. Annual physical exam (Primary)  -     Comprehensive Metabolic Panel; Future  -     Lipid Panel; Future  -     CBC (No Diff); Future    2. Need for immunization against influenza  -     FluLaval/Fluzone >6 mos ([id number])    3. Need for COVID-19 vaccine  -     COVID-19 Bivalent Booster (Pfizer) 12+yrs    4. Acute pain of left shoulder  -     Ambulatory Referral to Orthopedic Surgery    She does have pain in the left anterior shoulder that is worse with  Apley scratch and crossarm.  I have asked her to limit bodybuilding activities that affect the shoulder and will refer her to Ortho for further evaluation/mgmt. Will likely need PT as well.   She can use Tylenol 600 mg every 6 hours as needed over-the-counter for pain if needed.    COVID and flu vaccinations updated today.     Labs  ordered as above- will notify of results and treat accordingly. If patient has not received results within one week via mychart or letter, they will notify my office  Immunizations and screenings:  preventative/screenings are up-to-date/addressed as noted in the HPI.  Counseling: The patient is advised to continue current healthy lifestyle patterns and return for routine annual checkups  Health Maintenance reviewed .    Follow up: Return in about 1 year (around 10/31/2023) for Annual, and needs to return for labs within 1-2 weeks.  Plan of care discussed with pt. They verbalized understanding and agreement.     Electronically signed by : DEANDRE Rivers   10/31/2022   09:06 EDT              Dictated Utilizing Dragon Dictation   Please note that portions of this note were completed with a voice recognition program.   Part of this note may be an electronic transcription/translation of spoken language to printed text using the Dragon Dictation System.

## 2022-11-23 ENCOUNTER — OFFICE VISIT (OUTPATIENT)
Dept: ORTHOPEDIC SURGERY | Facility: CLINIC | Age: 37
End: 2022-11-23

## 2022-11-23 VITALS
HEIGHT: 65 IN | DIASTOLIC BLOOD PRESSURE: 74 MMHG | WEIGHT: 146.39 LBS | BODY MASS INDEX: 24.39 KG/M2 | SYSTOLIC BLOOD PRESSURE: 115 MMHG

## 2022-11-23 DIAGNOSIS — M25.512 LEFT SHOULDER PAIN, UNSPECIFIED CHRONICITY: ICD-10-CM

## 2022-11-23 DIAGNOSIS — M75.22 BICEPS TENDINITIS OF LEFT SHOULDER: ICD-10-CM

## 2022-11-23 DIAGNOSIS — S43.52XA SPRAIN OF LEFT ACROMIOCLAVICULAR JOINT, INITIAL ENCOUNTER: Primary | ICD-10-CM

## 2022-11-23 PROCEDURE — 20610 DRAIN/INJ JOINT/BURSA W/O US: CPT | Performed by: STUDENT IN AN ORGANIZED HEALTH CARE EDUCATION/TRAINING PROGRAM

## 2022-11-23 PROCEDURE — 99204 OFFICE O/P NEW MOD 45 MIN: CPT | Performed by: STUDENT IN AN ORGANIZED HEALTH CARE EDUCATION/TRAINING PROGRAM

## 2022-11-23 RX ORDER — ROPIVACAINE HYDROCHLORIDE 5 MG/ML
4 INJECTION, SOLUTION EPIDURAL; INFILTRATION; PERINEURAL
Status: COMPLETED | OUTPATIENT
Start: 2022-11-23 | End: 2022-11-23

## 2022-11-23 RX ORDER — LIDOCAINE HYDROCHLORIDE 10 MG/ML
4 INJECTION, SOLUTION EPIDURAL; INFILTRATION; INTRACAUDAL; PERINEURAL
Status: COMPLETED | OUTPATIENT
Start: 2022-11-23 | End: 2022-11-23

## 2022-11-23 RX ORDER — DICLOFENAC SODIUM 75 MG/1
75 TABLET, DELAYED RELEASE ORAL 2 TIMES DAILY WITH MEALS
Qty: 60 TABLET | Refills: 1 | Status: SHIPPED | OUTPATIENT
Start: 2022-11-23

## 2022-11-23 RX ORDER — TRIAMCINOLONE ACETONIDE 40 MG/ML
80 INJECTION, SUSPENSION INTRA-ARTICULAR; INTRAMUSCULAR
Status: COMPLETED | OUTPATIENT
Start: 2022-11-23 | End: 2022-11-23

## 2022-11-23 RX ADMIN — ROPIVACAINE HYDROCHLORIDE 4 ML: 5 INJECTION, SOLUTION EPIDURAL; INFILTRATION; PERINEURAL at 15:18

## 2022-11-23 RX ADMIN — LIDOCAINE HYDROCHLORIDE 4 ML: 10 INJECTION, SOLUTION EPIDURAL; INFILTRATION; INTRACAUDAL; PERINEURAL at 15:18

## 2022-11-23 RX ADMIN — TRIAMCINOLONE ACETONIDE 80 MG: 40 INJECTION, SUSPENSION INTRA-ARTICULAR; INTRAMUSCULAR at 15:18

## 2022-11-23 NOTE — PROGRESS NOTES
Procedure   Large Joint Arthrocentesis: L subacromial bursa  Date/Time: 11/23/2022 3:18 PM  Site marked: site marked  Timeout: Immediately prior to procedure a time out was called to verify the correct patient, procedure, equipment, support staff and site/side marked as required   Supporting Documentation  Indications: pain   Procedure Details  Location: shoulder - L subacromial bursa  Preparation: Patient was prepped and draped in the usual sterile fashion  Needle size: 22 G  Approach: posterior  Medications administered: 4 mL ropivacaine 0.5 %; 4 mL lidocaine PF 1% 1 %; 80 mg triamcinolone acetonide 40 MG/ML  Patient tolerance: patient tolerated the procedure well with no immediate complications

## 2022-11-23 NOTE — PROGRESS NOTES
[facility] [place] [facility] Office Visit     Office Visit       Date: 11/23/2022   Patient Name: Alesha Tolliver  MRN: [id number]  YOB: 1985    Referring Physician: Rosalia Armenta APRN     Chief Complaint:   Chief Complaint   Patient presents with   • Left Shoulder - Pain, Initial Evaluation     Anterior / Top Aspect       History of Present Illness:   Alesha Tolliver is a 37 y.o. female who presents with new problem of: anterior/top right shoulder pain.  Onset: possibly overhead lifting/bodybuilding. The issue has been ongoing for 1 year or more. Pain is a 5/10 on the pain scale. Pain is described as aching. Associated symptoms include pain. The pain is worse with sleeping, lying on affected side, any movement of the joint and weight bearing and overhead lifting.; ice and pain medication and/or NSAID improve the pain. Previous treatments have included: NSAIDS. Social history includes: Patient is not a smoker.    I have reviewed the following portions of the patient's history:History of Present Illness and review of systems.          Subjective   Review of Systems: Review of Systems   Constitutional: Negative for chills, fever, unexpected weight gain and unexpected weight loss.   HENT: Negative for congestion, postnasal drip and rhinorrhea.    Eyes: Negative for blurred vision.   Respiratory: Negative for shortness of breath.    Cardiovascular: Negative for leg swelling.   Gastrointestinal: Negative for abdominal pain, nausea and vomiting.   Genitourinary: Negative for difficulty urinating.   Musculoskeletal: Positive for arthralgias. Negative for gait problem, joint swelling and myalgias.   Skin: Negative for skin lesions and wound.   Neurological: Negative for dizziness, weakness, light-headedness and numbness.   Hematological: Does not bruise/bleed easily.   Psychiatric/Behavioral: Negative for depressed mood.        Past Medical History:   Past Medical  "History:   Diagnosis Date   • UTI (urinary tract infection) 2020       Past Surgical History:   Past Surgical History:   Procedure Laterality Date   •  SECTION      x3   • TUBAL ABDOMINAL LIGATION     • WISDOM TOOTH EXTRACTION         Family History:   Family History   Problem Relation Age of Onset   • Hypertension Mother    • No Known Problems Father    • Rashes / Skin problems Daughter         eczema   • Breast cancer Neg Hx    • Colon cancer Neg Hx    • Lung cancer Neg Hx    • Ovarian cancer Neg Hx        Social History:   Social History     Socioeconomic History   • Marital status: Single   • Number of children: 3   Tobacco Use   • Smoking status: Never   • Smokeless tobacco: Never   Substance and Sexual Activity   • Alcohol use: No   • Drug use: No   • Sexual activity: Yes     Partners: Male     Birth control/protection: Tubal ligation       Medications:   Current Outpatient Medications:   •  Multiple Vitamins-Minerals (MULTIVITAMIN ADULT PO), Take 1 tablet by mouth Daily., Disp: , Rfl:   •  Probiotic Product (DIGESTIVE ADVANTAGE PO), Take  by mouth., Disp: , Rfl:   •  sertraline (ZOLOFT) 50 MG tablet, 50 mg., Disp: , Rfl:   •  vitamin E 1000 UNIT capsule, Take 1,000 Units by mouth Daily., Disp: , Rfl:   •  diclofenac (VOLTAREN) 75 MG EC tablet, Take 1 tablet by mouth 2 (Two) Times a Day With Meals., Disp: 60 tablet, Rfl: 1    Allergies: No Known Allergies    I reviewed the patient's chief complaint, history of present illness, review of systems, past medical history, surgical history, family history, social history, medications and allergy list.     Objective    Vital Signs:   Vitals:    22 1405   BP: 115/74   Weight: 66.4 kg (146 lb 6.2 oz)   Height: 165.1 cm (65\")     Body mass index is 24.36 kg/m².   BMI is within normal parameters. No other follow-up for BMI required.     Patient reports that she is a non-smoker and has not ever been a smoker.  This behavior was applauded and she was " encouraged to continue in smoking cessation.  We will continue to monitor at subsequent visits.    Ortho Exam:  General: Well-appearing, no acute distress  Left shoulder: No erythema ecchymosis or swelling.  Good muscle definition.  Tender over the AC joint.  Tender anteriorly over the proximal biceps.  Positive speeds test.  Pain with crossarm movement.  Full range of motion though in abduction flexion extension and internal rotation.  Minor popping and clicking felt over the AC joint above 120 degrees abduction and flexion.  Sensation intact light touch.  2+ radial pulse.    Results Review:   Imaging Results (Last 24 Hours)     Procedure Component Value Units Date/Time    XR Shoulder 2+ View Left [[id number]] Resulted: 11/23/22 1455     Updated: 11/23/22 1457    Narrative:      Indication: Left shoulder pain    Views:AP lateral and scapular Y views of the shoulder are submitted.    Impression: There is no fracture subluxation or dislocation.  The   glenohumeral and acromioclavicular joint spaces are well-maintained.    There are no acute findings.    Comparison: No additional images are available for comparison review.            Procedures    Assessment / Plan    Assessment/Plan:   Diagnoses and all orders for this visit:    1. Sprain of left acromioclavicular joint, initial encounter (Primary)  -     diclofenac (VOLTAREN) 75 MG EC tablet; Take 1 tablet by mouth 2 (Two) Times a Day With Meals.  Dispense: 60 tablet; Refill: 1    2. Biceps tendinitis of left shoulder    3. Left shoulder pain, unspecified chronicity  -     XR Shoulder 2+ View Left    Other orders  -     Large Joint Arthrocentesis: L subacromial bursa       with competition next week.  Presents with classical signs of AC joint sprain from overhead lifting as well as biceps tendinitis from prolonged biceps work.  We discussed her conditions in detail including the nonoperative treatment management.  I reviewed her office note with Rosalia  DEANDRE Armenta on 10/31/2022.  She has been on Tylenol since that time.  She is also seeing a chiropractor.  However, these have not significantly improved her symptoms.  Therefore, we discussed additional treatment options including physical therapy to improve her pain while still allowing her to lift.  I advised her to reduce her overhead lifting and pain provoking maneuvers to only lifting and not outside of that.  I would add diclofenac in addition to the Tylenol.  We also discussed the risks including bleeding, infection, worsening pain as well as the benefits of improved pain and function of corticosteroid injection into the subacromial subdeltoid bursa to target multiple areas of the shoulder.  We will have her follow-up in 4 weeks to monitor her response.  She will be completed her competition and we may look at more focused AC joint or biceps tendon injection under ultrasound guidance.    Follow Up:   No follow-ups on file.      Garcia Vazquez MD  [facility] [place] [facility]

## 2022-12-21 ENCOUNTER — OFFICE VISIT (OUTPATIENT)
Dept: ORTHOPEDIC SURGERY | Facility: CLINIC | Age: 37
End: 2022-12-21

## 2022-12-21 VITALS
BODY MASS INDEX: 24.39 KG/M2 | HEIGHT: 65 IN | WEIGHT: 146.39 LBS | DIASTOLIC BLOOD PRESSURE: 76 MMHG | SYSTOLIC BLOOD PRESSURE: 118 MMHG

## 2022-12-21 DIAGNOSIS — S43.52XD SPRAIN OF LEFT ACROMIOCLAVICULAR JOINT, SUBSEQUENT ENCOUNTER: Primary | ICD-10-CM

## 2022-12-21 DIAGNOSIS — S46.012D ROTATOR CUFF STRAIN, LEFT, SUBSEQUENT ENCOUNTER: ICD-10-CM

## 2022-12-21 PROCEDURE — 99213 OFFICE O/P EST LOW 20 MIN: CPT | Performed by: STUDENT IN AN ORGANIZED HEALTH CARE EDUCATION/TRAINING PROGRAM

## 2022-12-21 NOTE — PROGRESS NOTES
[facility] [place] [facility] Surgery Office Follow Up Visit     Office Follow Up      Date: 12/21/2022   Patient Name: Alesha Tolliver  MRN: [id number]  YOB: 1985    Referring Physician: No ref. provider found     Chief Complaint:   Chief Complaint   Patient presents with   • Follow-up     4 WEEKS- Sprain of left acromioclavicular joint       History of Present Illness: Alesha Tolliver is a 37 y.o. female who is here today for follow up on left shoulder pain.  She is a .  At last visit, she was just weeks prior to a competition.  She had pain over the AC joint that have been exacerbated by her weightlifting.  This affected her range of motion.  She received a subacromial corticosteroid injection as well as oral diclofenac.  They significantly improved her pain and she was able to compete.  However, her motion is relatively reduced compared to the contralateral side.  When she performs internal rotation of the shoulder, she feels pain laterally and this causes her motion to be more stiff.    Subjective   Review of Systems: Review of Systems   Constitutional: Negative for chills, fever, unexpected weight gain and unexpected weight loss.   HENT: Negative for congestion, postnasal drip and rhinorrhea.    Eyes: Negative for blurred vision.   Respiratory: Negative for shortness of breath.    Cardiovascular: Negative for leg swelling.   Gastrointestinal: Negative for abdominal pain, nausea and vomiting.   Genitourinary: Negative for difficulty urinating.   Musculoskeletal: Positive for arthralgias. Negative for gait problem, joint swelling and myalgias.   Skin: Negative for skin lesions and wound.   Neurological: Negative for dizziness, weakness, light-headedness and numbness.   Hematological: Does not bruise/bleed easily.   Psychiatric/Behavioral: Negative for depressed mood.        Medications:   Current Outpatient Medications:   •  diclofenac (VOLTAREN) 75 MG EC  "tablet, Take 1 tablet by mouth 2 (Two) Times a Day With Meals., Disp: 60 tablet, Rfl: 1  •  Multiple Vitamins-Minerals (MULTIVITAMIN ADULT PO), Take 1 tablet by mouth Daily., Disp: , Rfl:   •  Probiotic Product (DIGESTIVE ADVANTAGE PO), Take  by mouth., Disp: , Rfl:   •  sertraline (ZOLOFT) 50 MG tablet, 50 mg., Disp: , Rfl:   •  vitamin E 1000 UNIT capsule, Take 1,000 Units by mouth Daily., Disp: , Rfl:     Allergies: No Known Allergies    I have reviewed and updated the patient's chief complaint, history of present illness, review of systems, past medical history, surgical history, family history, social history, medications and allergy list as appropriate.     Objective    Vital Signs:   Vitals:    12/21/22 1531   BP: 118/76   Weight: 66.4 kg (146 lb 6.2 oz)   Height: 165.1 cm (65\")     Body mass index is 24.36 kg/m².  BMI is within normal parameters. No other follow-up for BMI required.    Patient reports that she is a non-smoker and has not ever been a smoker.  This behavior was applauded and she was encouraged to continue in smoking cessation.  We will continue to monitor at subsequent visits.    Ortho Exam:  General: Well-appearing, no acute distress  Left shoulder: No erythema ecchymosis or swelling.  Tender laterally worsened by motion. No tenderness over the AC joint.  - tenderness anteriorly over the proximal biceps. - speeds test.  No pain with crossarm movement.  Full range of motion though in abduction flexion extension and internal rotation. + Song with pain laterally.  Minor popping and clicking felt over the AC joint above 120 degrees abduction and flexion.  Sensation intact light touch.  2+ radial pulse.    Results Review:   Imaging Results (Last 24 Hours)     ** No results found for the last 24 hours. **        Procedures    Assessment / Plan    Assessment/Plan:   Diagnoses and all orders for this visit:    1. Sprain of left acromioclavicular joint, subsequent encounter (Primary)  -     " Ambulatory Referral to Physical Therapy Evaluate and treat, Ortho; Electrotherapy; Soft Tissue Mobilizaton; Stretching, Strengthening, ROM    2. Rotator cuff strain, left, subsequent encounter  -     Ambulatory Referral to Physical Therapy Evaluate and treat, Ortho; Electrotherapy; Soft Tissue Mobilizaton; Stretching, Strengthening, ROM      Interval improvement in symptoms with oral anti-inflammatories and steroid injection at last visit into the subacromial subdeltoid bursa.  Pain greatly reduced and she has as functional as she desires.  However, she has reduced motion and internal rotation causes pain laterally.  Her AC joint is less tender on exam but I am concerned more now about her rotator cuff.  She is approximate 9 months from her next competition so now is a good time to work on general maintenance including physical therapy to improve her motion compared to the opposite side.  She is given referral today.  I would have her continue with the anti-inflammatory on an as-needed basis.  I plan to see her back in 6 weeks to monitor her response.    Follow Up:   Return in about 6 weeks (around 2/1/2023) for Recheck.      Garcia Vazquez MD  [facility] [place] [facility]

## 2023-02-27 ENCOUNTER — LAB (OUTPATIENT)
Dept: INTERNAL MEDICINE | Facility: CLINIC | Age: 38
End: 2023-02-27
Payer: COMMERCIAL

## 2023-02-27 ENCOUNTER — OFFICE VISIT (OUTPATIENT)
Dept: INTERNAL MEDICINE | Facility: CLINIC | Age: 38
End: 2023-02-27
Payer: COMMERCIAL

## 2023-02-27 VITALS
TEMPERATURE: 97.3 F | WEIGHT: 167.2 LBS | DIASTOLIC BLOOD PRESSURE: 66 MMHG | BODY MASS INDEX: 27.86 KG/M2 | HEART RATE: 74 BPM | HEIGHT: 65 IN | RESPIRATION RATE: 18 BRPM | SYSTOLIC BLOOD PRESSURE: 112 MMHG | OXYGEN SATURATION: 99 %

## 2023-02-27 DIAGNOSIS — Z00.00 ANNUAL PHYSICAL EXAM: ICD-10-CM

## 2023-02-27 DIAGNOSIS — Z11.3 SCREEN FOR STD (SEXUALLY TRANSMITTED DISEASE): ICD-10-CM

## 2023-02-27 DIAGNOSIS — Z11.3 SCREEN FOR STD (SEXUALLY TRANSMITTED DISEASE): Primary | ICD-10-CM

## 2023-02-27 LAB
ALBUMIN SERPL-MCNC: 4.6 G/DL (ref 3.5–5.2)
ALBUMIN/GLOB SERPL: 1.8 G/DL
ALP SERPL-CCNC: 44 U/L (ref 39–117)
ALT SERPL W P-5'-P-CCNC: 20 U/L (ref 1–33)
ANION GAP SERPL CALCULATED.3IONS-SCNC: 13.2 MMOL/L (ref 5–15)
AST SERPL-CCNC: 20 U/L (ref 1–32)
BILIRUB SERPL-MCNC: <0.2 MG/DL (ref 0–1.2)
BUN SERPL-MCNC: 27 MG/DL (ref 6–20)
BUN/CREAT SERPL: 31 (ref 7–25)
CALCIUM SPEC-SCNC: 9.2 MG/DL (ref 8.6–10.5)
CHLORIDE SERPL-SCNC: 104 MMOL/L (ref 98–107)
CHOLEST SERPL-MCNC: 175 MG/DL (ref 0–200)
CO2 SERPL-SCNC: 21.8 MMOL/L (ref 22–29)
CREAT SERPL-MCNC: 0.87 MG/DL (ref 0.57–1)
DEPRECATED RDW RBC AUTO: 38.9 FL (ref 37–54)
EGFRCR SERPLBLD CKD-EPI 2021: 88.1 ML/MIN/1.73
ERYTHROCYTE [DISTWIDTH] IN BLOOD BY AUTOMATED COUNT: 11.2 % (ref 12.3–15.4)
GLOBULIN UR ELPH-MCNC: 2.5 GM/DL
GLUCOSE SERPL-MCNC: 83 MG/DL (ref 65–99)
HAV IGM SERPL QL IA: NORMAL
HBV CORE IGM SERPL QL IA: NORMAL
HBV SURFACE AG SERPL QL IA: NORMAL
HCT VFR BLD AUTO: 37.2 % (ref 34–46.6)
HCV AB SER DONR QL: NORMAL
HDLC SERPL-MCNC: 60 MG/DL (ref 40–60)
HGB BLD-MCNC: 12.6 G/DL (ref 12–15.9)
HIV1+2 AB SER QL: NORMAL
LDLC SERPL CALC-MCNC: 102 MG/DL (ref 0–100)
LDLC/HDLC SERPL: 1.68 {RATIO}
MCH RBC QN AUTO: 31.8 PG (ref 26.6–33)
MCHC RBC AUTO-ENTMCNC: 33.9 G/DL (ref 31.5–35.7)
MCV RBC AUTO: 93.9 FL (ref 79–97)
PLATELET # BLD AUTO: 224 10*3/MM3 (ref 140–450)
PMV BLD AUTO: 11.6 FL (ref 6–12)
POTASSIUM SERPL-SCNC: 4 MMOL/L (ref 3.5–5.2)
PROT SERPL-MCNC: 7.1 G/DL (ref 6–8.5)
RBC # BLD AUTO: 3.96 10*6/MM3 (ref 3.77–5.28)
SODIUM SERPL-SCNC: 139 MMOL/L (ref 136–145)
TRIGL SERPL-MCNC: 71 MG/DL (ref 0–150)
VLDLC SERPL-MCNC: 13 MG/DL (ref 5–40)
WBC NRBC COR # BLD: 4.62 10*3/MM3 (ref 3.4–10.8)

## 2023-02-27 PROCEDURE — 80061 LIPID PANEL: CPT | Performed by: NURSE PRACTITIONER

## 2023-02-27 PROCEDURE — 86696 HERPES SIMPLEX TYPE 2 TEST: CPT | Performed by: NURSE PRACTITIONER

## 2023-02-27 PROCEDURE — 87661 TRICHOMONAS VAGINALIS AMPLIF: CPT | Performed by: NURSE PRACTITIONER

## 2023-02-27 PROCEDURE — 80053 COMPREHEN METABOLIC PANEL: CPT | Performed by: NURSE PRACTITIONER

## 2023-02-27 PROCEDURE — 99213 OFFICE O/P EST LOW 20 MIN: CPT | Performed by: NURSE PRACTITIONER

## 2023-02-27 PROCEDURE — 80074 ACUTE HEPATITIS PANEL: CPT | Performed by: NURSE PRACTITIONER

## 2023-02-27 PROCEDURE — G0432 EIA HIV-1/HIV-2 SCREEN: HCPCS | Performed by: NURSE PRACTITIONER

## 2023-02-27 PROCEDURE — 86695 HERPES SIMPLEX TYPE 1 TEST: CPT | Performed by: NURSE PRACTITIONER

## 2023-02-27 PROCEDURE — 36415 COLL VENOUS BLD VENIPUNCTURE: CPT | Performed by: NURSE PRACTITIONER

## 2023-02-27 PROCEDURE — 86592 SYPHILIS TEST NON-TREP QUAL: CPT | Performed by: NURSE PRACTITIONER

## 2023-02-27 PROCEDURE — 87591 N.GONORRHOEAE DNA AMP PROB: CPT | Performed by: NURSE PRACTITIONER

## 2023-02-27 PROCEDURE — 85027 COMPLETE CBC AUTOMATED: CPT | Performed by: NURSE PRACTITIONER

## 2023-02-27 PROCEDURE — 87491 CHLMYD TRACH DNA AMP PROBE: CPT | Performed by: NURSE PRACTITIONER

## 2023-02-28 LAB — RPR SER QL: NORMAL

## 2023-03-01 LAB
C TRACH RRNA SPEC QL NAA+PROBE: NEGATIVE
HSV1 IGG SER IA-ACNC: 5.52 INDEX (ref 0–0.9)
HSV2 IGG SER IA-ACNC: 6.79 INDEX (ref 0–0.9)
N GONORRHOEA RRNA SPEC QL NAA+PROBE: NEGATIVE
T VAGINALIS RRNA SPEC QL NAA+PROBE: NEGATIVE

## 2023-11-03 ENCOUNTER — OFFICE VISIT (OUTPATIENT)
Dept: INTERNAL MEDICINE | Facility: CLINIC | Age: 38
End: 2023-11-03
Payer: COMMERCIAL

## 2023-11-03 ENCOUNTER — LAB (OUTPATIENT)
Dept: INTERNAL MEDICINE | Facility: CLINIC | Age: 38
End: 2023-11-03
Payer: COMMERCIAL

## 2023-11-03 VITALS
SYSTOLIC BLOOD PRESSURE: 118 MMHG | OXYGEN SATURATION: 100 % | DIASTOLIC BLOOD PRESSURE: 78 MMHG | RESPIRATION RATE: 16 BRPM | BODY MASS INDEX: 28.79 KG/M2 | HEART RATE: 88 BPM | HEIGHT: 65 IN | WEIGHT: 172.8 LBS

## 2023-11-03 DIAGNOSIS — Z00.00 ANNUAL PHYSICAL EXAM: ICD-10-CM

## 2023-11-03 DIAGNOSIS — R79.89 LOW TESTOSTERONE: ICD-10-CM

## 2023-11-03 DIAGNOSIS — Z00.00 ANNUAL PHYSICAL EXAM: Primary | ICD-10-CM

## 2023-11-03 DIAGNOSIS — Z23 NEEDS FLU SHOT: ICD-10-CM

## 2023-11-03 LAB
ALBUMIN SERPL-MCNC: 4.2 G/DL (ref 3.5–5.2)
ALBUMIN/GLOB SERPL: 1.3 G/DL
ALP SERPL-CCNC: 50 U/L (ref 39–117)
ALT SERPL W P-5'-P-CCNC: 16 U/L (ref 1–33)
ANION GAP SERPL CALCULATED.3IONS-SCNC: 8.2 MMOL/L (ref 5–15)
AST SERPL-CCNC: 23 U/L (ref 1–32)
BILIRUB SERPL-MCNC: 0.4 MG/DL (ref 0–1.2)
BUN SERPL-MCNC: 14 MG/DL (ref 6–20)
BUN/CREAT SERPL: 14 (ref 7–25)
CALCIUM SPEC-SCNC: 9.3 MG/DL (ref 8.6–10.5)
CHLORIDE SERPL-SCNC: 106 MMOL/L (ref 98–107)
CO2 SERPL-SCNC: 24.8 MMOL/L (ref 22–29)
CREAT SERPL-MCNC: 1 MG/DL (ref 0.57–1)
DEPRECATED RDW RBC AUTO: 38.8 FL (ref 37–54)
EGFRCR SERPLBLD CKD-EPI 2021: 74.1 ML/MIN/1.73
ERYTHROCYTE [DISTWIDTH] IN BLOOD BY AUTOMATED COUNT: 11.7 % (ref 12.3–15.4)
GLOBULIN UR ELPH-MCNC: 3.3 GM/DL
GLUCOSE SERPL-MCNC: 77 MG/DL (ref 65–99)
HCT VFR BLD AUTO: 40 % (ref 34–46.6)
HGB BLD-MCNC: 13.5 G/DL (ref 12–15.9)
MCH RBC QN AUTO: 30.7 PG (ref 26.6–33)
MCHC RBC AUTO-ENTMCNC: 33.8 G/DL (ref 31.5–35.7)
MCV RBC AUTO: 90.9 FL (ref 79–97)
PLATELET # BLD AUTO: 257 10*3/MM3 (ref 140–450)
PMV BLD AUTO: 11.1 FL (ref 6–12)
POTASSIUM SERPL-SCNC: 4.3 MMOL/L (ref 3.5–5.2)
PROT SERPL-MCNC: 7.5 G/DL (ref 6–8.5)
RBC # BLD AUTO: 4.4 10*6/MM3 (ref 3.77–5.28)
SODIUM SERPL-SCNC: 139 MMOL/L (ref 136–145)
WBC NRBC COR # BLD: 4.29 10*3/MM3 (ref 3.4–10.8)

## 2023-11-03 PROCEDURE — 80053 COMPREHEN METABOLIC PANEL: CPT | Performed by: NURSE PRACTITIONER

## 2023-11-03 PROCEDURE — 84402 ASSAY OF FREE TESTOSTERONE: CPT | Performed by: NURSE PRACTITIONER

## 2023-11-03 PROCEDURE — 36415 COLL VENOUS BLD VENIPUNCTURE: CPT | Performed by: NURSE PRACTITIONER

## 2023-11-03 PROCEDURE — 84403 ASSAY OF TOTAL TESTOSTERONE: CPT | Performed by: NURSE PRACTITIONER

## 2023-11-03 PROCEDURE — 85027 COMPLETE CBC AUTOMATED: CPT | Performed by: NURSE PRACTITIONER

## 2023-11-03 NOTE — PROGRESS NOTES
"  Patient Care Team:  Rosalia Armenta APRN as PCP - General (Nurse Practitioner)  Madison Nayak     Chief Complaint   Patient presents with    Annual Exam     Physical Exam, pt is requesting labs     Immunizations     Flu Shot           Patient is a 38 y.o. female who presents for her yearly physical exam.     HPI    She is here for an annual exam.    She wishes to obtain more labs. She is experiencing fatigue. Her testosterone was slighty low on her last labs. She reports she is taking supplements and vitamins. She is eating healthy. She reports she has started taking the DIM supplement to help regulate her menses. She states her menstrual cycle is every 28 days. She states she exercises 6 times weekly.     She has no concerns of sexually transmitted infections. She does perform self-breast exams. She has no concerning areas on her breast or skin.       See's \"[facility]\" for anx/dep. ON zoloft. Doing well.       Health maintenance/lifestyle:  Health Maintenance   Topic Date Due    COVID-19 Vaccine (4 - 2023-24 season) 01/23/2024 (Originally 9/1/2023)    PAP SMEAR  11/03/2024 (Originally 9/11/2023)    BMI FOLLOWUP  12/21/2023    ANNUAL PHYSICAL  11/03/2024    TDAP/TD VACCINES (4 - Td or Tdap) 09/11/2030    HEPATITIS C SCREENING  Completed    INFLUENZA VACCINE  Completed    Pneumococcal Vaccine 0-64  Aged Out     Immunization History   Administered Date(s) Administered    COVID-19 (PFIZER) BIVALENT 12+YRS 10/31/2022    COVID-19 (PFIZER) Purple Cap Monovalent 03/31/2021, 04/28/2021    Fluzone (or Fluarix & Flulaval for VFC) >6mos 10/31/2022, 11/03/2023    Hep A, Unspecified 11/12/2018    Hep B, Adolescent or Pediatric 04/08/1998, 05/08/1998, 10/08/1998    Hepatitis B Adolescent High Risk Infant 05/08/1998, 10/08/1998    Hepatitis B Adult/Adolescent IM 05/08/1998, 10/08/1998    Influenza, Unspecified 10/14/2014, 11/30/2015, 11/04/2016, 10/17/2017, 10/12/2018, 10/21/2019, 10/15/2020    MMR 09/14/1988, " 04/08/1998    Td (TDVAX) 04/08/1998    Tdap 02/05/2013, 09/11/2020     Cancer-related family history is negative for Breast cancer, Colon cancer, Lung cancer, and Ovarian cancer.   reports being sexually active and has had partner(s) who are male. She reports using the following method of birth control/protection: Tubal ligation.  Social History     Tobacco Use   Smoking Status Never    Passive exposure: Never   Smokeless Tobacco Never     Social History     Substance and Sexual Activity   Alcohol Use No       [facility]  Covid vaccine: 10/31/2022    Influenza: 11/03/2023  Tetanus: 09/11/2030    Hep A: 11/12/2018    Hep C screening: completed    Eye exam: completed  Dental exam: completed    Sunscreen use: yes  Seatbelt use: yes        PHQ-2 Depression Screening  Little interest or pleasure in doing things? 0-->not at all   Feeling down, depressed, or hopeless? 0-->not at all   PHQ-2 Total Score 0         Review of Systems   Constitutional:  Positive for fatigue (improved). Negative for fever and unexpected weight loss.   Eyes:  Negative for blurred vision, double vision and visual disturbance.   Respiratory:  Negative for cough, shortness of breath and wheezing.    Cardiovascular:  Negative for chest pain, palpitations and leg swelling.   Gastrointestinal:  Negative for abdominal pain, constipation, diarrhea, nausea and vomiting.   Genitourinary:  Positive for menstrual problem (improved). Negative for difficulty urinating, frequency and urgency.   Musculoskeletal:  Negative for arthralgias and myalgias.   Skin:  Negative for color change and rash.   Neurological:  Negative for dizziness, weakness and headache.   Hematological:  Negative for adenopathy. Does not bruise/bleed easily.         History  Social History     Socioeconomic History    Marital status: Single    Number of children: 3   Tobacco Use    Smoking status: Never     Passive exposure: Never    Smokeless tobacco: Never   Vaping Use    Vaping Use: Never  "used   Substance and Sexual Activity    Alcohol use: No    Drug use: No    Sexual activity: Yes     Partners: Male     Birth control/protection: Tubal ligation     Past Medical History:   Diagnosis Date    Arthritis     UTI (urinary tract infection) 2020      Past Surgical History:   Procedure Laterality Date     SECTION      x3    COSMETIC SURGERY      TUBAL ABDOMINAL LIGATION      WISDOM TOOTH EXTRACTION        No Known Allergies   Family History   Problem Relation Age of Onset    Hypertension Mother     No Known Problems Father     Rashes / Skin problems Daughter         eczema    Depression Daughter     Breast cancer Neg Hx     Colon cancer Neg Hx     Lung cancer Neg Hx     Ovarian cancer Neg Hx        Current Outpatient Medications:     Multiple Vitamins-Minerals (MULTIVITAMIN ADULT PO), Take 1 tablet by mouth Daily., Disp: , Rfl:     Probiotic Product (DIGESTIVE ADVANTAGE PO), Take  by mouth., Disp: , Rfl:     sertraline (ZOLOFT) 50 MG tablet, 1 tablet. Pt is taking 75mg since December, Disp: , Rfl:     vitamin E 1000 UNIT capsule, Take 1 capsule by mouth Daily., Disp: , Rfl:                   /78 (BP Location: Right arm, Patient Position: Sitting, Cuff Size: Adult)   Pulse 88   Resp 16   Ht 165.1 cm (65\")   Wt 78.4 kg (172 lb 12.8 oz)   LMP 10/03/2023 (Approximate)   SpO2 100%   BMI 28.76 kg/m²       Physical Exam  Constitutional:       Appearance: Normal appearance. She is well-developed.   HENT:      Head: Normocephalic and atraumatic.   Eyes:      General: No scleral icterus.     Conjunctiva/sclera: Conjunctivae normal.   Cardiovascular:      Rate and Rhythm: Normal rate and regular rhythm.      Heart sounds: Normal heart sounds.   Pulmonary:      Effort: Pulmonary effort is normal. No respiratory distress.      Breath sounds: Normal breath sounds.   Abdominal:      General: Bowel sounds are normal.      Palpations: Abdomen is soft.      Tenderness: There is no abdominal tenderness. "   Musculoskeletal:         General: Normal range of motion.      Cervical back: Normal range of motion.      Right lower leg: No edema.      Left lower leg: No edema.   Skin:     General: Skin is warm and dry.   Neurological:      General: No focal deficit present.      Mental Status: She is alert and oriented to person, place, and time.   Psychiatric:         Attention and Perception: Attention normal.         Mood and Affect: Mood and affect normal.         Behavior: Behavior normal. Behavior is cooperative.         Thought Content: Thought content normal.         Cognition and Memory: Cognition normal.         Judgment: Judgment normal.        Vitamin D,25-Hydroxy (07/07/2023 15:47)  Vitamin B12 (07/07/2023 15:47)  Ferritin (07/07/2023 15:47)  Comprehensive Metabolic Panel (07/07/2023 15:47)  CBC (No Diff) (07/07/2023 15:47)  T4, free (07/07/2023 15:47)  T3, free (07/07/2023 15:47)  Luteinizing Hormone (07/07/2023 15:47)  Follicle Stimulating Hormone (07/07/2023 15:47)  TSH (07/07/2023 15:47)  Testosterone, Free, Total (07/07/2023 15:47)  Progesterone (07/07/2023 15:47)  Estradiol (07/07/2023 15:47)  Hemoglobin A1c (07/07/2023 15:47)  Lipid Panel (07/07/2023 15:47)            Diagnoses and all orders for this visit:    1. Annual physical exam (Primary)  -     CBC (No Diff); Future  -     Comprehensive Metabolic Panel; Future  -     Testosterone, Free, Total; Future    2. Needs flu shot  -     Fluzone (or Fluarix & Flulaval for VFC) >6 Mos ([id number])    3. Low testosterone  -     Testosterone, Free, Total; Future    Annual exam  - We will update influenza vaccination today. She declines Pap smear, breast exam, and COVID-19 vaccination. She did have a slightly low testosterone on recent labs and is desiring to recheck that, so labs ordered for recheck.  Counseling: diet and exercise   Labs  ordered as above- will notify of results and treat accordingly. If patient has not received results within one week via  MyChart or letter, they will notify my office  Follow up: Return in about 1 year (around 11/3/2024) for Annual, and need to collect labs today.  Plan of care discussed with patient. They verbalized understanding and agreement.     Electronically signed by : DEANDRE Rivers    11/3/2023   12:50 EDT          Transcribed from ambient dictation for DEANDRE Rivers by Vernon Ramirez.  11/03/23   10:38 EDT    Patient or patient representative verbalized consent to the visit recording.  I have personally performed the services described in this document as transcribed by the above individual, and it is both accurate and complete.  DEANDRE Rivers  11/3/2023  12:51 EDT

## 2023-11-08 LAB
TESTOST FREE SERPL-MCNC: 0.6 PG/ML (ref 0–4.2)
TESTOST SERPL-MCNC: 8 NG/DL (ref 8–60)

## 2024-04-22 DIAGNOSIS — F41.9 ANXIETY AND DEPRESSION: Primary | ICD-10-CM

## 2024-04-22 DIAGNOSIS — F32.A ANXIETY AND DEPRESSION: Primary | ICD-10-CM

## 2024-04-22 NOTE — TELEPHONE ENCOUNTER
Caller: Alesha Tolliver    Relationship: Self    Best call back number: [phone]     Requested Prescriptions:   Requested Prescriptions     Pending Prescriptions Disp Refills    sertraline (ZOLOFT) 50 MG tablet       Si tablet. Pt is taking 75mg since December        Pharmacy where request should be sent: [facility] [id number] [address] - [phone]  - [phone] FX     Last office visit with prescribing clinician: 11/3/2023   Last telemedicine visit with prescribing clinician: Visit date not found   Next office visit with prescribing clinician: 2024     Additional details provided by patient: PRESCRIBING DOCTOR IS ASKING THAT HORACE GONZALES TAKE OVER THIS PRESCRIPTION.    Does the patient have less than a 3 day supply:  [] Yes  [x] No    Would you like a call back once the refill request has been completed: [] Yes [x] No    If the office needs to give you a call back, can they leave a voicemail: [] Yes [x] No    Cordell Hoffman   24 12:41 EDT

## 2024-04-23 NOTE — TELEPHONE ENCOUNTER
Rx Refill Note  Requested Prescriptions     Pending Prescriptions Disp Refills    sertraline (ZOLOFT) 50 MG tablet       Si tablet. Pt is taking 75mg since December      Last office visit with prescribing clinician: 11/3/2023   Last telemedicine visit with prescribing clinician: Visit date not found   Next office visit with prescribing clinician: 2024     Susy Tomas MA  24, 12:10 EDT

## 2024-05-01 ENCOUNTER — TELEMEDICINE (OUTPATIENT)
Dept: INTERNAL MEDICINE | Facility: CLINIC | Age: 39
End: 2024-05-01
Payer: COMMERCIAL

## 2024-05-01 DIAGNOSIS — F33.0 MILD EPISODE OF RECURRENT MAJOR DEPRESSIVE DISORDER: Primary | ICD-10-CM

## 2024-05-01 PROCEDURE — 1160F RVW MEDS BY RX/DR IN RCRD: CPT | Performed by: NURSE PRACTITIONER

## 2024-05-01 PROCEDURE — 99213 OFFICE O/P EST LOW 20 MIN: CPT | Performed by: NURSE PRACTITIONER

## 2024-05-01 PROCEDURE — 1159F MED LIST DOCD IN RCRD: CPT | Performed by: NURSE PRACTITIONER

## 2024-05-01 NOTE — PROGRESS NOTES
This was an audio and video enabled visit.   This provider is located at   the [facility] (through [facility] [place] [facility]), [address], [place] [address] using a secure [employer] Video Visit through [employer] or [employer] (pt preference). Alesha  is being seen remotely via telehealth  in [place]. Pt provided a secure environment for this session.  Alesha may be asked to present for in office testing and/or evaluation if felt to be unsafe for telemedicine.    The provider identified herself /credentials as appropriate.   By proceeding with the telehealth visit, the patient consents to be seen remotely which allows for patient identifiable information to be sent to a third party as needed. The patient may refuse to be seen remotely at any time. The electronic data is encrypted and password protected, and the patient is advised of the potential risks to privacy not withstanding such measures.    You have chosen to receive care through a telehealth visit. Do you consent to use a video/audio connection for your medical care today? Yes      Subjective   Alesha Tolliver is a 38 y.o. female.     Chief Complaint   Patient presents with    Depression       History of Present Illness     History of Present Illness  The patient presents via virtual visit for a refill of her antidepressant.    The patient is currently on a regimen of 75 mg of sertraline, however, she expresses a desire to gradually reduce the dosage to 50 mg. She reports that the medication appears to be effective in managing her symptoms, and she has been incorporating more meditation and self-healing techniques into her routine to manage her depression and anxiety. Her sleep patterns are reported to be satisfactory. Occasionally, she experiences feelings of hopelessness and overwhelmed, however, these coping strategies such as meditating, walking, and listening to music have proven beneficial. She has ceased  counseling sessions, which were initiated prior to her medication initiation. She has been on sertraline for approximately 2 years, as prescribed by her previous counselor, who has since left the practice.    Results      The following portions of the patient's history were reviewed and updated as appropriate: allergies, current medications, past family history, past medical history, past social history, past surgical history and problem list.          Outpatient Medications Marked as Taking for the 5/1/24 encounter (Telemedicine) with GeovanyRosalia APRN   Medication Sig Dispense Refill    sertraline (ZOLOFT) 50 MG tablet Take 1 tablet by mouth Daily. Pt is taking 75mg since December 90 tablet 1     No Known Allergies        Objective     Physical Exam   Constitutional: She is oriented to person, place, and time. She appears well-developed and well-nourished. No distress.   HENT:   Head: Normocephalic and atraumatic.   Right Ear: External ear normal.   Left Ear: External ear normal.   Mouth/Throat: Oropharynx is clear and moist.   Eyes: Right eye exhibits no discharge. Left eye exhibits no discharge. No scleral icterus.   Neck: Neck normal appearance.  Pulmonary/Chest: Effort normal. No accessory muscle usage.  No respiratory distress.No use of oxygen by nasal cannula  Abdominal: Abdomen appears normal.   Neurological: She is alert and oriented to person, place, and time.   Skin: Skin is dry. She is not diaphoretic. No erythema. No pallor.   Psychiatric: She has a normal mood and affect. Her speech is normal and behavior is normal. Judgment and thought content normal. She is attentive.         There were no vitals filed for this visit.  There is no height or weight on file to calculate BMI.        Assessment & Plan     Diagnoses and all orders for this visit:    1. Mild episode of recurrent major depressive disorder (Primary)  -     sertraline (ZOLOFT) 50 MG tablet; Take 1 tablet by mouth Daily. Pt is taking  75mg since December  Dispense: 90 tablet; Refill: 1        Assessment & Plan  1. depression.  A prescription for Zoloft 50 mg, with a 90-day supply, has been issued. The patient is advised to maintain this regimen for a period of 2 months, after which a subsequent consultation will be arranged to cont to monitor            Return in about 3 months (around 8/1/2024) for Recheck.    I have reviewed the limitations of a telehealth visit (such as lack of a physical exam and unable to obtain vital signs) and advised the patient that they may need to follow up for an office visit should their symptoms or concerns persist, worsen, or change.  Patient was encouraged to keep me informed of any acute changes, lack of improvement, or any new concerning symptoms.   I discussed my findings,recommendations, and plan of care was with the patient. They verbalized understanding and agreement.    Patient or patient representative verbalized consent for the use of Ambient Listening during the visit with  DEANDRE Rivers for chart documentation. 5/1/2024  12:41 EDT

## 2024-07-20 PROCEDURE — 87529 HSV DNA AMP PROBE: CPT | Performed by: FAMILY MEDICINE

## 2024-07-20 PROCEDURE — 87798 DETECT AGENT NOS DNA AMP: CPT | Performed by: FAMILY MEDICINE

## 2024-07-20 PROCEDURE — 87801 DETECT AGNT MULT DNA AMPLI: CPT | Performed by: FAMILY MEDICINE

## 2024-07-20 PROCEDURE — 87591 N.GONORRHOEAE DNA AMP PROB: CPT | Performed by: FAMILY MEDICINE

## 2024-07-20 PROCEDURE — 87491 CHLMYD TRACH DNA AMP PROBE: CPT | Performed by: FAMILY MEDICINE

## 2024-07-20 PROCEDURE — 87661 TRICHOMONAS VAGINALIS AMPLIF: CPT | Performed by: FAMILY MEDICINE

## 2024-11-08 ENCOUNTER — LAB (OUTPATIENT)
Dept: INTERNAL MEDICINE | Facility: CLINIC | Age: 39
End: 2024-11-08
Payer: COMMERCIAL

## 2024-11-08 ENCOUNTER — OFFICE VISIT (OUTPATIENT)
Dept: INTERNAL MEDICINE | Facility: CLINIC | Age: 39
End: 2024-11-08
Payer: COMMERCIAL

## 2024-11-08 VITALS
RESPIRATION RATE: 16 BRPM | HEART RATE: 68 BPM | TEMPERATURE: 98 F | HEIGHT: 64 IN | BODY MASS INDEX: 28.41 KG/M2 | SYSTOLIC BLOOD PRESSURE: 106 MMHG | WEIGHT: 166.4 LBS | OXYGEN SATURATION: 99 % | DIASTOLIC BLOOD PRESSURE: 62 MMHG

## 2024-11-08 DIAGNOSIS — F33.0 MILD EPISODE OF RECURRENT MAJOR DEPRESSIVE DISORDER: ICD-10-CM

## 2024-11-08 DIAGNOSIS — Z11.3 SCREEN FOR STD (SEXUALLY TRANSMITTED DISEASE): ICD-10-CM

## 2024-11-08 DIAGNOSIS — Z23 NEED FOR INFLUENZA VACCINATION: ICD-10-CM

## 2024-11-08 DIAGNOSIS — Z00.00 ANNUAL PHYSICAL EXAM: Primary | ICD-10-CM

## 2024-11-08 DIAGNOSIS — Z00.00 ANNUAL PHYSICAL EXAM: ICD-10-CM

## 2024-11-08 LAB
ALBUMIN SERPL-MCNC: 4.2 G/DL (ref 3.5–5.2)
ALBUMIN/GLOB SERPL: 1.2 G/DL
ALP SERPL-CCNC: 59 U/L (ref 39–117)
ALT SERPL W P-5'-P-CCNC: 15 U/L (ref 1–33)
ANION GAP SERPL CALCULATED.3IONS-SCNC: 14.7 MMOL/L (ref 5–15)
AST SERPL-CCNC: 20 U/L (ref 1–32)
BILIRUB SERPL-MCNC: 0.5 MG/DL (ref 0–1.2)
BUN SERPL-MCNC: 19 MG/DL (ref 6–20)
BUN/CREAT SERPL: 17 (ref 7–25)
CALCIUM SPEC-SCNC: 9.6 MG/DL (ref 8.6–10.5)
CHLORIDE SERPL-SCNC: 101 MMOL/L (ref 98–107)
CHOLEST SERPL-MCNC: 191 MG/DL (ref 0–200)
CO2 SERPL-SCNC: 26.3 MMOL/L (ref 22–29)
CREAT SERPL-MCNC: 1.12 MG/DL (ref 0.57–1)
DEPRECATED RDW RBC AUTO: 40.9 FL (ref 37–54)
EGFRCR SERPLBLD CKD-EPI 2021: 64.3 ML/MIN/1.73
ERYTHROCYTE [DISTWIDTH] IN BLOOD BY AUTOMATED COUNT: 11.8 % (ref 12.3–15.4)
GLOBULIN UR ELPH-MCNC: 3.6 GM/DL
GLUCOSE SERPL-MCNC: 71 MG/DL (ref 65–99)
HAV IGM SERPL QL IA: NORMAL
HBV CORE IGM SERPL QL IA: NORMAL
HBV SURFACE AG SERPL QL IA: NORMAL
HCT VFR BLD AUTO: 39.2 % (ref 34–46.6)
HCV AB SER QL: NORMAL
HDLC SERPL-MCNC: 61 MG/DL (ref 40–60)
HGB BLD-MCNC: 12.8 G/DL (ref 12–15.9)
HIV 1+2 AB+HIV1 P24 AG SERPL QL IA: NORMAL
LDLC SERPL CALC-MCNC: 122 MG/DL (ref 0–100)
LDLC/HDLC SERPL: 2 {RATIO}
MCH RBC QN AUTO: 30.7 PG (ref 26.6–33)
MCHC RBC AUTO-ENTMCNC: 32.7 G/DL (ref 31.5–35.7)
MCV RBC AUTO: 94 FL (ref 79–97)
PLATELET # BLD AUTO: 266 10*3/MM3 (ref 140–450)
PMV BLD AUTO: 11.3 FL (ref 6–12)
POTASSIUM SERPL-SCNC: 4.1 MMOL/L (ref 3.5–5.2)
PROT SERPL-MCNC: 7.8 G/DL (ref 6–8.5)
RBC # BLD AUTO: 4.17 10*6/MM3 (ref 3.77–5.28)
RPR SER QL: NORMAL
SODIUM SERPL-SCNC: 142 MMOL/L (ref 136–145)
TRIGL SERPL-MCNC: 41 MG/DL (ref 0–150)
VLDLC SERPL-MCNC: 8 MG/DL (ref 5–40)
WBC NRBC COR # BLD AUTO: 3.42 10*3/MM3 (ref 3.4–10.8)

## 2024-11-08 PROCEDURE — 36415 COLL VENOUS BLD VENIPUNCTURE: CPT | Performed by: NURSE PRACTITIONER

## 2024-11-08 PROCEDURE — 86592 SYPHILIS TEST NON-TREP QUAL: CPT | Performed by: NURSE PRACTITIONER

## 2024-11-08 PROCEDURE — G0432 EIA HIV-1/HIV-2 SCREEN: HCPCS | Performed by: NURSE PRACTITIONER

## 2024-11-08 PROCEDURE — 80074 ACUTE HEPATITIS PANEL: CPT | Performed by: NURSE PRACTITIONER

## 2024-11-08 PROCEDURE — 80061 LIPID PANEL: CPT | Performed by: NURSE PRACTITIONER

## 2024-11-08 PROCEDURE — 85027 COMPLETE CBC AUTOMATED: CPT | Performed by: NURSE PRACTITIONER

## 2024-11-08 PROCEDURE — 80053 COMPREHEN METABOLIC PANEL: CPT | Performed by: NURSE PRACTITIONER

## 2024-11-08 NOTE — PROGRESS NOTES
Patient Care Team:  Rosalia Armenta APRN as PCP - General (Nurse Practitioner)  Madison Nayak     Chief Complaint   Patient presents with    Annual Exam     Pt is not fasting                Patient is a 39 y.o. female who presents for her yearly physical exam.     HPI   History of Present Illness    Diet: healthy   1c coffee, small energy drink occasionally   Not much dairy.    Exercise: bodybuilding  SBE: yes,  No concerns.   Menses regular,    3 kids- ages 14-21.   Had tubal.    Wants routine STD screen - no symptoms   No skin concerns- wears sunscreen.       Depression - ran out of zoloft. Needs refill. Does  not feel anxious.  No HI/SI    PHQ-9 Depression Screening  Little interest or pleasure in doing things? Several days   Feeling down, depressed, or hopeless? Over half   PHQ-2 Total Score 3   Trouble falling or staying asleep, or sleeping too much? Not at all   Feeling tired or having little energy? Not at all   Poor appetite or overeating? Not at all   Feeling bad about yourself - or that you are a failure or have let yourself or your family down? Not at all   Trouble concentrating on things, such as reading the newspaper or watching television? Not at all   Moving or speaking so slowly that other people could have noticed? Or the opposite - being so fidgety or restless that you have been moving around a lot more than usual? Not at all   Thoughts that you would be better off dead, or of hurting yourself in some way? Not at all   PHQ-9 Total Score 3   If you checked off any problems, how difficult have these problems made it for you to do your work, take care of things at home, or get along with other people? Somewhat difficult         Health maintenance/lifestyle:  Health Maintenance   Topic Date Due    PAP SMEAR  09/11/2023    INFLUENZA VACCINE  08/01/2024    ANNUAL PHYSICAL  11/03/2024    COVID-19 Vaccine (4 - 2024-25 season) 11/10/2024 (Originally 9/1/2024)    BMI FOLLOWUP  11/08/2025    TDAP/TD  VACCINES (4 - Td or Tdap) 09/11/2030    HEPATITIS C SCREENING  Completed    Pneumococcal Vaccine 0-64  Aged Out     Immunization History   Administered Date(s) Administered    COVID-19 (PFIZER) BIVALENT 12+YRS 10/31/2022    COVID-19 (PFIZER) Purple Cap Monovalent 03/31/2021, 04/28/2021    Fluzone (or Fluarix & Flulaval for VFC) >6mos 10/31/2022, 11/03/2023    Hep A, Unspecified 11/12/2018    Hep B, Adolescent or Pediatric 04/08/1998, 05/08/1998, 10/08/1998    Hepatitis B Adolescent High Risk Infant 05/08/1998, 10/08/1998    Hepatitis B Adult/Adolescent IM 05/08/1998, 10/08/1998    Influenza, Unspecified 10/14/2014, 11/30/2015, 11/04/2016, 10/17/2017, 10/12/2018, 10/21/2019, 10/15/2020    MMR 09/14/1988, 04/08/1998    Td (TDVAX) 04/08/1998    Tdap 02/05/2013, 09/11/2020      reports being sexually active and has had partner(s) who are male. She reports using the following method of birth control/protection: Tubal ligation.  Social History     Tobacco Use   Smoking Status Never    Passive exposure: Never   Smokeless Tobacco Never     Social History     Substance and Sexual Activity   Alcohol Use No           Review of Systems   Constitutional:  Negative for fatigue, fever and unexpected weight loss.   Eyes:  Negative for blurred vision, double vision and visual disturbance.   Respiratory:  Negative for cough, shortness of breath and wheezing.    Cardiovascular:  Negative for chest pain, palpitations and leg swelling.   Gastrointestinal:  Negative for abdominal pain, constipation, diarrhea, nausea and vomiting.   Genitourinary:  Negative for difficulty urinating, frequency and urgency.   Musculoskeletal:  Negative for arthralgias and myalgias.   Skin:  Negative for color change and rash.   Neurological:  Negative for dizziness, weakness and headache.   Hematological:  Negative for adenopathy. Does not bruise/bleed easily.   Psychiatric/Behavioral:  Positive for depressed mood. Negative for sleep disturbance, suicidal  "ideas and stress. The patient is not nervous/anxious.            History  Social History     Socioeconomic History    Marital status: Single    Number of children: 3   Tobacco Use    Smoking status: Never     Passive exposure: Never    Smokeless tobacco: Never   Vaping Use    Vaping status: Never Used   Substance and Sexual Activity    Alcohol use: No    Drug use: No    Sexual activity: Yes     Partners: Male     Birth control/protection: Tubal ligation     Past Medical History:   Diagnosis Date    Arthritis     UTI (urinary tract infection) 2020      Past Surgical History:   Procedure Laterality Date     SECTION      x3    COSMETIC SURGERY      TUBAL ABDOMINAL LIGATION      WISDOM TOOTH EXTRACTION        No Known Allergies   Family History   Problem Relation Age of Onset    Hypertension Mother     No Known Problems Father     Rashes / Skin problems Daughter         eczema    Depression Daughter     Breast cancer Neg Hx     Colon cancer Neg Hx     Lung cancer Neg Hx     Ovarian cancer Neg Hx        Current Outpatient Medications:     Multiple Vitamins-Minerals (MULTIVITAMIN ADULT PO), Take 1 tablet by mouth Daily., Disp: , Rfl:     NON FORMULARY, Thyroid support, Disp: , Rfl:     Probiotic Product (DIGESTIVE ADVANTAGE PO), Take  by mouth., Disp: , Rfl:     sertraline (ZOLOFT) 50 MG tablet, Take 1 tablet by mouth Daily., Disp: 90 tablet, Rfl: 1    vitamin E 1000 UNIT capsule, Take 1 capsule by mouth Daily., Disp: , Rfl:                   /62 (BP Location: Left arm, Patient Position: Sitting, Cuff Size: Adult)   Pulse 68   Temp 98 °F (36.7 °C) (Infrared)   Resp 16   Ht 162.6 cm (64\")   Wt 75.5 kg (166 lb 6.4 oz)   LMP 10/31/2024 (Approximate)   SpO2 99%   BMI 28.56 kg/m²       Physical Exam       Physical Exam  Vitals and nursing note reviewed. Exam conducted with a chaperone present.   Constitutional:       General: She is not in acute distress.     Appearance: Normal appearance. She is " well-developed. She is not ill-appearing, toxic-appearing or diaphoretic.   HENT:      Head: Normocephalic and atraumatic.      Right Ear: Tympanic membrane and external ear normal.      Left Ear: Tympanic membrane and external ear normal.      Nose: Nose normal.      Mouth/Throat:      Lips: Pink. No lesions.      Mouth: Mucous membranes are moist.      Tongue: No lesions.      Palate: No mass.      Pharynx: Oropharynx is clear.   Eyes:      General: Lids are normal. No scleral icterus.        Right eye: No discharge.         Left eye: No discharge.      Conjunctiva/sclera: Conjunctivae normal.      Pupils: Pupils are equal, round, and reactive to light.   Neck:      Thyroid: No thyroid mass, thyromegaly or thyroid tenderness.      Vascular: No carotid bruit or JVD.      Trachea: No tracheal deviation.   Cardiovascular:      Rate and Rhythm: Normal rate and regular rhythm.      Chest Wall: PMI is not displaced.      Pulses: Normal pulses.      Heart sounds: Normal heart sounds. No murmur heard.     No friction rub. No gallop.   Pulmonary:      Effort: Pulmonary effort is normal. No respiratory distress.      Breath sounds: Normal breath sounds. No wheezing or rales.   Chest:      Chest wall: No tenderness.   Breasts:     Breasts are symmetrical.      Right: No swelling, bleeding, inverted nipple, mass, nipple discharge, skin change or tenderness.      Left: No swelling, bleeding, inverted nipple, mass, nipple discharge, skin change or tenderness.   Abdominal:      General: Bowel sounds are normal. There is no distension.      Palpations: Abdomen is soft. There is no mass.      Tenderness: There is no abdominal tenderness.      Hernia: No hernia is present.   Genitourinary:     Exam position: Supine.      Pubic Area: No rash.       Labia:         Right: No rash or tenderness.         Left: No rash or tenderness.       Vagina: Normal.      Cervix: Normal.      Uterus: Normal.       Adnexa: Right adnexa normal and  left adnexa normal.   Musculoskeletal:         General: No tenderness or deformity. Normal range of motion.      Cervical back: Normal range of motion and neck supple.      Right lower leg: No edema.      Left lower leg: No edema.   Lymphadenopathy:      Head:      Right side of head: No submental, submandibular, tonsillar, preauricular, posterior auricular or occipital adenopathy.      Left side of head: No submental, submandibular, tonsillar, preauricular, posterior auricular or occipital adenopathy.      Cervical: No cervical adenopathy.   Skin:     General: Skin is warm and dry.      Coloration: Skin is not pale.      Findings: No erythema or rash.   Neurological:      General: No focal deficit present.      Mental Status: She is alert and oriented to person, place, and time.   Psychiatric:         Attention and Perception: Attention normal.         Mood and Affect: Mood and affect normal.         Speech: Speech normal.         Behavior: Behavior normal. Behavior is cooperative.         Thought Content: Thought content normal.         Cognition and Memory: Cognition normal.         Judgment: Judgment normal.                  Diagnoses and all orders for this visit:    1. Annual physical exam (Primary)  -     CBC (No Diff); Future  -     Comprehensive Metabolic Panel; Future  -     Lipid Panel; Future    2. Mild episode of recurrent major depressive disorder  -     sertraline (ZOLOFT) 50 MG tablet; Take 1 tablet by mouth Daily.  Dispense: 90 tablet; Refill: 1  -     CBC (No Diff); Future  -     Comprehensive Metabolic Panel; Future  -     Lipid Panel; Future    3. Need for influenza vaccination  -     Fluzone >6mos (9860-5472)    4. Screen for STD (sexually transmitted disease)  -     LIQUID-BASED PAP SMEAR WITH HPV GENOTYPING REGARDLESS OF INTERPRETATION (REGLA,COR,MAD); Future  -     Hepatitis Panel, Acute; Future  -     HIV-1 / O / 2 Ag / Antibody; Future  -     RPR Qualitative with Reflex to Quant;  Future    Update flu vaccine today.  Declines COVID-vaccine.  Tetanus due 2030.  Will update routine labs   Labs  ordered as above- will notify of results and treat accordingly. If patient has not received results within one week via mychart or letter, they will notify my office  Ran out of her Zoloft.  Depression will more active since doing so.  Was stable on previous dose of 50 mg.  Will restart her on that  Counseling-continue healthy diet and routine physical activity.  Decrease caffeine consumption/energy drinks.    Follow up: Return in about 6 months (around 5/8/2025) for chronic condition follow up, and need to collect labs today.  Plan of care discussed with patient. They verbalized understanding and agreement.     Electronically signed by : DEANDRE Rivers    11/8/2024   09:07 EST           Patient or patient representative verbalized consent for the use of Ambient Listening during the visit with  DEANDRE Rivers for chart documentation. 11/8/2024  09:09 EST

## 2024-11-14 LAB — REF LAB TEST METHOD: NORMAL

## 2025-04-28 DIAGNOSIS — F33.0 MILD EPISODE OF RECURRENT MAJOR DEPRESSIVE DISORDER: ICD-10-CM

## 2025-05-23 ENCOUNTER — OFFICE VISIT (OUTPATIENT)
Dept: INTERNAL MEDICINE | Facility: CLINIC | Age: 40
End: 2025-05-23
Payer: COMMERCIAL

## 2025-05-23 VITALS
HEIGHT: 64 IN | BODY MASS INDEX: 29.98 KG/M2 | DIASTOLIC BLOOD PRESSURE: 82 MMHG | OXYGEN SATURATION: 98 % | HEART RATE: 75 BPM | TEMPERATURE: 96.9 F | SYSTOLIC BLOOD PRESSURE: 110 MMHG | WEIGHT: 175.6 LBS | RESPIRATION RATE: 16 BRPM

## 2025-05-23 DIAGNOSIS — F41.9 ANXIETY: Primary | ICD-10-CM

## 2025-05-23 DIAGNOSIS — Z11.3 SCREEN FOR STD (SEXUALLY TRANSMITTED DISEASE): ICD-10-CM

## 2025-05-23 DIAGNOSIS — F33.0 MILD EPISODE OF RECURRENT MAJOR DEPRESSIVE DISORDER: ICD-10-CM

## 2025-05-23 PROCEDURE — 87801 DETECT AGNT MULT DNA AMPLI: CPT | Performed by: NURSE PRACTITIONER

## 2025-05-23 PROCEDURE — 87661 TRICHOMONAS VAGINALIS AMPLIF: CPT | Performed by: NURSE PRACTITIONER

## 2025-05-23 PROCEDURE — 87591 N.GONORRHOEAE DNA AMP PROB: CPT | Performed by: NURSE PRACTITIONER

## 2025-05-23 PROCEDURE — 87491 CHLMYD TRACH DNA AMP PROBE: CPT | Performed by: NURSE PRACTITIONER

## 2025-05-23 PROCEDURE — 1126F AMNT PAIN NOTED NONE PRSNT: CPT | Performed by: NURSE PRACTITIONER

## 2025-05-23 PROCEDURE — 87798 DETECT AGENT NOS DNA AMP: CPT | Performed by: NURSE PRACTITIONER

## 2025-05-23 PROCEDURE — 99214 OFFICE O/P EST MOD 30 MIN: CPT | Performed by: NURSE PRACTITIONER

## 2025-05-23 NOTE — PROGRESS NOTES
Subjective   Alesha Tolliver is a 39 y.o. female.     Chief Complaint   Patient presents with    Depression    Med Refill    Screen for STD       PCP: Rosalia Armenta APRN    History of Present Illness     History of Present Illness  The patient is a 39-year-old female who is here for follow-up on depression, requesting a refill of her antidepressant. She is currently on sertraline. She is also requesting STD screening.    She reports experiencing anxiety in addition to her depression. She attributes her current state of anxiety and depression to personal circumstances rather than the medication. She does not believe there is a need to adjust the dosage of her medication. She has previously engaged in counseling and continues to apply the strategies learned from her therapist, which she finds beneficial. She is contemplating discontinuing the medication and relying solely on these strategies. She is currently on sertraline, which she takes nightly and finds effective.    She expresses a desire to undergo STD screening every 6 months. She reports no new sexual partners at present but acknowledges having had a new partner within the past 6 months. She has a history of bacterial vaginosis (BV) but was asymptomatic at the time. She does not express any concerns about potential pregnancy.  PHQ-9 Depression Screening  Little interest or pleasure in doing things? Nearly every day   Feeling down, depressed, or hopeless? Nearly every day   PHQ-2 Total Score 6   Trouble falling or staying asleep, or sleeping too much? Not at all   Feeling tired or having little energy? Not at all   Poor appetite or overeating? Not at all   Feeling bad about yourself - or that you are a failure or have let yourself or your family down? Not at all   Trouble concentrating on things, such as reading the newspaper or watching television? Several days   Moving or speaking so slowly that other people could have noticed? Or the opposite - being  so fidgety or restless that you have been moving around a lot more than usual? Not at all     Thoughts that you would be better off dead, or of hurting yourself in some way? Not at all   PHQ-9 Total Score 7   If you checked off any problems, how difficult have these problems made it for you to do your work, take care of things at home, or get along with other people? Somewhat difficult       Anxiety QUEENIE-7    Feeling nervous, anxious or on edge: Several days  Not being able to stop or control worrying: Several days  Worrying too much about different things: Several days  Trouble Relaxing: Not at all  Being so restless that it is hard to sit still: Not at all  Becoming easily annoyed or irritable: Not at all  Feeling afraid as if something awful might happen: Not at all  QUEENIE 7 Total Score: 3  If you checked any problems, how difficult have these problems made it for you to do your work, take care of things at home, or get along with other people: Not difficult at all       Results      Lab Results   Component Value Date    WBC 3.42 11/08/2024    HGB 12.8 11/08/2024    HCT 39.2 11/08/2024    MCV 94.0 11/08/2024     11/08/2024     Lab Results   Component Value Date    GLUCOSE 71 11/08/2024    BUN 19 11/08/2024    CREATININE 1.12 (H) 11/08/2024    EGFR 64.3 11/08/2024    BCR 17.0 11/08/2024    K 4.1 11/08/2024    CO2 26.3 11/08/2024    CALCIUM 9.6 11/08/2024    ALBUMIN 4.2 11/08/2024    BILITOT 0.5 11/08/2024    AST 20 11/08/2024    ALT 15 11/08/2024     Lab Results   Component Value Date    CHOL 191 11/08/2024    TRIG 41 11/08/2024    HDL 61 (H) 11/08/2024     (H) 11/08/2024     Lab Results   Component Value Date    TSH 0.729 07/07/2023         The following portions of the patient's history were reviewed and updated as appropriate: allergies, current medications, past family history, past medical history, past social history, past surgical history and problem list.              Outpatient Medications  "Marked as Taking for the 5/23/25 encounter (Office Visit) with Rosalia Armenta APRN   Medication Sig Dispense Refill    Multiple Vitamins-Minerals (MULTIVITAMIN ADULT PO) Take 1 tablet by mouth Daily.      NON FORMULARY Thyroid support      Probiotic Product (DIGESTIVE ADVANTAGE PO) Take  by mouth.      PROGESTERONE PO Take 150 mg by mouth.      sertraline (ZOLOFT) 50 MG tablet Take 1 tablet by mouth Daily. 30 tablet 6    TESTOSTERONE IM Inject  into the appropriate muscle as directed by prescriber.      vitamin E 1000 UNIT capsule Take 1 capsule by mouth Daily.      [DISCONTINUED] sertraline (ZOLOFT) 50 MG tablet Take 1 tablet by mouth Daily. 30 tablet 0     No Known Allergies        Objective     Vitals:    05/23/25 1313   BP: 110/82   BP Location: Right arm   Patient Position: Sitting   Cuff Size: Adult   Pulse: 75   Resp: 16   Temp: 96.9 °F (36.1 °C)   TempSrc: Infrared   SpO2: 98%   Weight: 79.7 kg (175 lb 9.6 oz)   Height: 162.6 cm (64.02\")   PainSc: 0-No pain     Body mass index is 30.13 kg/m².  Wt Readings from Last 3 Encounters:   05/23/25 79.7 kg (175 lb 9.6 oz)   11/08/24 75.5 kg (166 lb 6.4 oz)   07/20/24 77.1 kg (169 lb 15.6 oz)       Physical Exam  Vitals and nursing note reviewed.   Constitutional:       General: She is not in acute distress.     Appearance: Normal appearance. She is well-developed. She is not ill-appearing or diaphoretic.   HENT:      Head: Normocephalic and atraumatic.   Eyes:      General: No scleral icterus.     Conjunctiva/sclera: Conjunctivae normal.   Neck:      Vascular: No JVD.   Cardiovascular:      Rate and Rhythm: Normal rate and regular rhythm.      Chest Wall: PMI is not displaced. No thrill.      Heart sounds: Normal heart sounds. No murmur heard.  Pulmonary:      Effort: Pulmonary effort is normal. No accessory muscle usage or respiratory distress.      Breath sounds: Normal breath sounds.   Chest:      Chest wall: No tenderness.   Abdominal:      General: Bowel " sounds are normal. There is no distension.      Palpations: Abdomen is soft.      Tenderness: There is no abdominal tenderness.   Musculoskeletal:         General: Normal range of motion.      Cervical back: Normal range of motion.      Right lower leg: No edema.      Left lower leg: No edema.   Skin:     General: Skin is warm and dry.      Coloration: Skin is not ashen, jaundiced, mottled or pale.      Findings: No erythema.   Neurological:      General: No focal deficit present.      Mental Status: She is alert and oriented to person, place, and time.   Psychiatric:         Attention and Perception: Attention normal.         Mood and Affect: Mood and affect normal.         Behavior: Behavior normal. Behavior is cooperative.         Thought Content: Thought content normal.         Cognition and Memory: Cognition normal.         Judgment: Judgment normal.                   Assessment & Plan   Diagnoses and all orders for this visit:    1. Anxiety (Primary)  -     sertraline (ZOLOFT) 50 MG tablet; Take 1 tablet by mouth Daily.  Dispense: 30 tablet; Refill: 6    2. Mild episode of recurrent major depressive disorder  -     sertraline (ZOLOFT) 50 MG tablet; Take 1 tablet by mouth Daily.  Dispense: 30 tablet; Refill: 6    3. Screen for STD (sexually transmitted disease)  -     NuSwab VG+ - Swab, Vagina; Future  -     NuSwab VG+ - Swab, Vagina        Assessment & Plan  1. Depression.  - Depression is currently stable with the existing dosage of sertraline.  - No adjustment in medication dosage is needed at this time.  - Continued use of sertraline 50 mg is advised, with a prescription refill provided for 6 months.  - If symptoms persist or worsen, a follow-up appointment should be scheduled for potential dosage adjustment.    2. Anxiety.  - Anxiety is related to personal issues but does not require a dosage adjustment at this time.  - Coping strategies taught by the previous therapist are being utilized effectively.  -  Consideration for returning to counseling if anxiety symptoms persist or worsen.  - Further treatment options can be discussed if necessary.    3. STD screening.  - Vaginal swab will be conducted to screen for bacterial vaginosis (BV), yeast, gonorrhea, chlamydia, and trichomonas.  - Blood work will be performed to screen for herpes, HIV, and syphilis.  - Patient prefers the swab method for screening.    Follow-up  - Follow-up in 6 months for annual examination and routine laboratory tests.            Return in about 6 months (around 11/23/2025) for Annual.    I discussed my findings,recommendations, and plan of care was with the patient. They verbalized understanding and agreement.  Patient was encouraged to keep me informed of any acute changes, lack of improvement, or any new concerning symptoms.     Patient or patient representative verbalized consent for the use of Ambient Listening during the visit with  DEANDRE Rivers for chart documentation. 5/26/2025  15:30 EDT

## 2025-05-27 LAB
A VAGINAE DNA VAG QL NAA+PROBE: ABNORMAL SCORE
BVAB2 DNA VAG QL NAA+PROBE: ABNORMAL SCORE
C ALBICANS DNA VAG QL NAA+PROBE: NEGATIVE
C GLABRATA DNA VAG QL NAA+PROBE: NEGATIVE
C TRACH DNA SPEC QL NAA+PROBE: NEGATIVE
MEGA1 DNA VAG QL NAA+PROBE: ABNORMAL SCORE
N GONORRHOEA DNA VAG QL NAA+PROBE: NEGATIVE
T VAGINALIS DNA VAG QL NAA+PROBE: NEGATIVE